# Patient Record
Sex: FEMALE | Race: BLACK OR AFRICAN AMERICAN | Employment: OTHER | URBAN - METROPOLITAN AREA
[De-identification: names, ages, dates, MRNs, and addresses within clinical notes are randomized per-mention and may not be internally consistent; named-entity substitution may affect disease eponyms.]

---

## 2022-06-15 ENCOUNTER — HOSPITAL ENCOUNTER (OUTPATIENT)
Dept: PHYSICAL THERAPY | Age: 62
Setting detail: RECURRING SERIES
Discharge: HOME | End: 2022-06-18
Payer: MEDICARE

## 2022-06-15 PROCEDURE — 97162 PT EVAL MOD COMPLEX 30 MIN: CPT

## 2022-06-15 ASSESSMENT — PAIN SCALES - GENERAL: PAINLEVEL_OUTOF10: 6

## 2022-06-15 NOTE — THERAPY EVALUATION
Christal Lee  : 1960  Primary: Medicare Part A And B  Secondary: [facility] @ [address] [place] [address] Way [address]  Phone: [phone]  Fax: [phone] Plan Frequency: 2/week x 12 weeks    Plan of Care/Certification Expiration Date: 22      PT Visit Info: Total # of Visits to Date: 1      OUTPATIENT PHYSICAL THERAPY:Initial Evaluation 6/15/2022               Episode  Appt Desk         Treatment Diagnosis:  Difficulty in walking, Not elsewhere classified (R26.2)  Low Back Pain (M54.5) Pain in right hip (M25. 551)  Medical/Referring Diagnosis:  Spondylosis without myelopathy or radiculopathy, lumbosacral region [M47.817]  Chronic pain syndrome [G89.4]  Unilateral primary osteoarthritis, unspecified hip [M16.10]  Referring Physician:  Taina Montgomery MD Orders:  PT Eval and Treat Aquatics  Return MD Appt:    Date of Onset:  Onset Date: 09/15/15     Allergies:  Patient has no allergy information on record. Restrictions/Precautions:    Restrictions/Precautions: None  No data recorded   Medications Last Reviewed:  6/15/2022     SUBJECTIVE   History of Injury/Illness (Reason for Referral):  Pt had a fall in  walking into work and she has had problems ever since. Pt states she is coming to therapy now because her R side is very painful and she cannot stand for very long. She also missed the last step on her stairs and christine herself in April and has been hurting on her right side into her groin, runs down both legs into knees and ankles. She also complains of low back pain that goes through to her ant trunk. She is also complaining of pain and swelling in her L arm and elbow. R hip xray has osteoarthritis per her recent x ray. She I having difficulty with standing, walking and daily activities.   Patient Stated Goal(s):  \" Be able to sit a whole service in [employer], stand long enough to wash dishes or tej.\"  Initial:     6/10 Post Session:    6  /10  Past Medical History/Comorbidities:   Ms. Lorenzo Cárdenas  has no past medical history on file. Ms. Lorenzo Cárdenas  has no past surgical history on file. 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  No data recordedNo data recorded   Learning:   Does the patient/guardian have any barriers to learning?: No barriers  Will there be a co-learner?: No  What is the preferred language of the patient/guardian?: English  Is an  required?: No  How does the patient/guardian prefer to learn new concepts?: Listening; Reading; Demonstration     Fall Risk Scale: Total Score: 25  Avila Fall Risk: Medium (25-44)           OBJECTIVE   Trunk ROM-  Trunk flex- finger tips to shin pain in R low back and down L LE                         Ext- to neutral painful                         Side bending - limited to 50%  Painful both directions  Seated LE strength test grossly-  WFL  Limited hip flexion ROM in sitting  Timed up and go- 12.5 seconds  Gait - Walks with slowed gait with limited step length            B ext rotation of feet,   Stairs- used combination of reciprocal and step to gait,  Significant use of UE performed 4 stairs    ASSESSMENT   Initial Assessment:    Problem List: (Impacting functional limitations): Body Structures, Functions, Activity Limitations Requiring Skilled Therapeutic Intervention: Decreased functional mobility ; Decreased ROM; Decreased strength;  Increased pain; Decreased posture      Therapy Prognosis:   Therapy Prognosis: Good     Assessment Complexity:   Medium Complexity  PLAN   Effective Dates: 6/15/22 TO Plan of Care/Certification Expiration Date: 08/17/22     Frequency/Duration: Plan Frequency: 2/week x 12 weeks     Interventions Planned (Treatment may consist of any combination of the following):    Current Treatment Recommendations: Strengthening; ROM; Manual Therapy - Soft Tissue Mobilization; Manual Therapy - Joint Manipulation; Pain management; Home exercise program; Aquatics     Goals: (Goals have been discussed and agreed upon with patient.)  Short-Term Functional Goals: Time Frame: 3-4 weeks  1. Pt to perform 45 minutes of aquatic therapy 2/week consistently  2. Pt to report a decrease in pain either intensity or frequency  3. Discharge Goals: Time Frame: 9-12 weeks  1. 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 Pt to improve TUG by 2 seconds or greater. 3. Improve Oswestry score by 10 or greater indicating improved function. 4. Improve hip flexibility to accommodate longer stepping   5. Up/down  6 stairs with hand railing with greater ease with step over step gait. Outcome Measure: Tool Used: Modified Oswestry Low Back Pain   Score:  Initial: 28/50  Most Recent: X/50 (Date: -- )   Interpretation of Score: Each section is scored on a 0-5 scale, 5 representing the greatest disability. The scores of each section are added together for a total score of 50. Tool Used: Timed Up and Go (TUG)  Score:  Initial: 12.5 seconds Most Recent: X seconds (Date: -- )   Interpretation of Score: The test measures, in seconds, the time taken by an individual to stand up from a standard arm chair (seat height 46 cm [18 in], arm height 65 cm [25.6 in]), walk a distance of 3 meters (118 in, approx 10 ft), turn, walk back to the chair and sit down. If the individual takes longer than 14 seconds to complete TUG, this indicates risk for falls. Medical Necessity:    Patient is expected to demonstrate progress in strength, range of motion, balance and coordination to improve safety during ambulation and stair use.   Also improve endurance for cooking and d=adls. .  Reason For Services/Other Comments:     Total Duration: 45 min  Time In: 1115  Time Out: 1200    Regarding Derwin Fabry Campbell's therapy, I certify that the treatment plan above will be carried out by a therapist or under their direction.   Thank you for this referral,  Clinton Miller, PT     Referring Physician Signature: Jayuya Door* _______________________________ Date _____________        Post Session Pain  Charge Capture  PT Visit Info  POC Link  Treatment Note Link  MD Guidelines  MyChart

## 2022-06-20 ENCOUNTER — HOSPITAL ENCOUNTER (OUTPATIENT)
Dept: PHYSICAL THERAPY | Age: 62
Setting detail: RECURRING SERIES
Discharge: HOME | End: 2022-06-23
Payer: MEDICARE

## 2022-06-20 PROCEDURE — 97113 AQUATIC THERAPY/EXERCISES: CPT

## 2022-06-20 NOTE — PROGRESS NOTES
Dominique Fields  : 1960  Primary: Medicare Part A And B  Secondary: [facility] @ [address] [place] [address] Way [address]  Phone: [phone]  Fax: [phone] Plan Frequency: 2/week x 12 weeks    Plan of Care/Certification Expiration Date: 22      PT Visit Info: Total # of Visits to Date: 2      OUTPATIENT PHYSICAL THERAPY:OP NOTE TYPE: Treatment Note 2022       Episode  }Appt Desk              Treatment Diagnosis: Difficulty in walking, Not elsewhere classified (R26.2)  Low Back Pain (M54.5) Pain in right hip (M25. 551)  Medical/Referring Diagnosis:  Spondylosis without myelopathy or radiculopathy, lumbosacral region [M47.817]  Chronic pain syndrome [G89.4]  Unilateral primary osteoarthritis, unspecified hip [M16.10]  Referring Physician:  Yareli Redd MD Orders:  PT Eval and Treat   Date of Onset:  Onset Date: 09/15/15     Allergies:   Patient has no allergy information on record. Restrictions/Precautions:  Restrictions/Precautions: None  No data recorded   Interventions Planned (Treatment may consist of any combination of the following):    Current Treatment Recommendations: Strengthening; ROM; Manual Therapy - Soft Tissue Mobilization; Manual Therapy - Joint Manipulation; Pain management; Home exercise program; Aquatics     Subjective Comments:  No reports of pain currently. Initial:}     (Pt reports mild pain post treatment.)/10Post Session:        /10  Medications Last Reviewed:  2022  Updated Objective Findings:  Pt ambulates with decreased arm swing and decreased heel strike. Treatment   AQUATIC EXERCISE: (20 minutes):    Exercises per grid below to improve mobility, strength and balance. Required MOD visual and verbal cues to promote proper body mechanics. Progressed repetitions and complexity of movement as indicated.      Date:  22 Date:   Date:     Aquatic exercise Parameters Parameters Parameters   Lateral walk x3 laps      Forward walk x3 laps with cues to increase arm swing     Retro walk x3 laps with one hand on bar     Hip abduction x15 B     Heel raises x20     Step ups x5 ea LE with UE support                 Treatment/Session Summary:    · Treatment Assessment:  Pt required frequent verbal cues for correct body mechanics and posture. She is nervous getting in the seven foot water so the therapist will be in the water with her next visit. · Communication/Consultation:  None today  · Equipment provided today:  None  · Recommendations/Intent for next treatment session: Next visit will focus on strengthening in the pool.     Total Treatment Billable Duration:  20 minutes (pt had the wrong appointment time)  Time In: 1436  Time Out: [address], PTA       Charge Capture  }Post Session Pain  PT Visit Info  [facility]  MD Guidelines  Scanned Media  Benefits  MyChart    Future Appointments   Date Time Provider Heber Lockett   6/23/2022 10:30 AM Alyse Iqbal, PTA [facility]   6/27/2022 11:45 AM Taylor Moser Nearing, PT [facility]   6/30/2022 11:45 AM Taylor Moser Nearing, PT [facility]   7/5/2022 11:15 AM Alyse Huger, PTA [facility]   7/7/2022 11:15 AM Alyse Huger, PTA [facility]   7/12/2022 11:15 AM Alyse Huger, PTA [facility]   7/14/2022 11:15 AM Alyse Huger, PTA [facility]

## 2022-06-23 ENCOUNTER — HOSPITAL ENCOUNTER (OUTPATIENT)
Dept: PHYSICAL THERAPY | Age: 62
Setting detail: RECURRING SERIES
Discharge: HOME | End: 2022-06-26
Payer: MEDICARE

## 2022-06-23 PROCEDURE — 97113 AQUATIC THERAPY/EXERCISES: CPT

## 2022-06-23 NOTE — PROGRESS NOTES
Marisol Pepper  : 1960  Primary: Medicare Part A And B  Secondary: [facility] @ [address] [place] [address] Way [address]  Phone: [phone]  Fax: [phone] Plan Frequency: 2/week x 12 weeks    Plan of Care/Certification Expiration Date: 22      PT Visit Info: Total # of Visits to Date: 3      OUTPATIENT PHYSICAL THERAPY:OP NOTE TYPE: Treatment Note 2022       Episode  }Appt Desk              Treatment Diagnosis: Difficulty in walking, Not elsewhere classified (R26.2)  Low Back Pain (M54.5) Pain in right hip (M25. 551)  Medical/Referring Diagnosis:  Spondylosis without myelopathy or radiculopathy, lumbosacral region [M47.817]  Chronic pain syndrome [G89.4]  Unilateral primary osteoarthritis, unspecified hip [M16.10]  Referring Physician:  Ching Lora MD Orders:  PT Eval and Treat   Date of Onset:  Onset Date: 09/15/15     Allergies:   Patient has no allergy information on record. Restrictions/Precautions:  Restrictions/Precautions: None  No data recorded   Interventions Planned (Treatment may consist of any combination of the following):    Current Treatment Recommendations: Strengthening; ROM; Manual Therapy - Soft Tissue Mobilization; Manual Therapy - Joint Manipulation; Pain management; Home exercise program; Aquatics     Subjective Comments:  Pt states \"I was really sore all over last time. \"  Initial:}     (Pt reports high levels of back pain.)/10Post Session:        /10, no increase reported  Medications Last Reviewed:  2022  Updated Objective Findings:  Pt ambulates with decreased arm swing and decreased heel strike. Treatment   AQUATIC EXERCISE: (35 minutes):    Exercises per grid below to improve mobility, strength and balance. Required MOD visual and verbal cues to promote proper body mechanics. Progressed repetitions and complexity of movement as indicated.  The therapist was in the water with the pt due to fear of deep water. Date:  6/20/22 Date:  6/23/22 Date:     Aquatic exercise Parameters Parameters Parameters   Lateral walk x3 laps  x3 laps    Forward walk x3 laps with cues to increase arm swing Forward/back x 3 laps    Retro walk x3 laps with one hand on bar     marching  x3 laps    Hip abduction x15 B Hip 3 way x15 B each direction with tactile cues    Heel raises x20     Step ups x5 ea LE with UE support     Standing posterior pelvic tilt  x10 with tactile cues    Lunge stretch  20 sec hold x 4 on first pool step with tactile cues    Deep well   With noodle and holding onto therapist    Hip abduction/adduction  x30    Hip flexion/ext  x30    Deep well hang  x2 min          Treatment/Session Summary:    · Treatment Assessment:  Pt reported relief with tactile cues for the hip exercises. She reported some relief from treatment. · Communication/Consultation:  None today  · Equipment provided today:  None  · Recommendations/Intent for next treatment session: Next visit will focus on strengthening in the pool.     Total Treatment Billable Duration:  35 minutes (pt was a few min late)  Time In: 1040  Time Out: 1115    Carmen Bell, PTA       Charge Capture  }Post Session Pain  PT Visit Info  [email or web address] Portal  MD Guidelines  Scanned Media  Benefits  MyChart    Future Appointments   Date Time Provider Heber Lockett   6/27/2022  4:00 PM Solitario Leblanc [facility]   6/30/2022 11:45 AM Taylor Carvajal, PT [facility]   7/5/2022 11:15 AM Carmen [place], PTA [facility]   7/7/2022 11:15 AM Carmen [place], PTA [facility]   7/12/2022 11:15 AM Carmen [place], PTA [facility]   7/14/2022 11:15 AM Carmen [place], PTA [facility]

## 2022-06-29 ENCOUNTER — HOSPITAL ENCOUNTER (OUTPATIENT)
Dept: PHYSICAL THERAPY | Age: 62
Setting detail: RECURRING SERIES
Discharge: HOME | End: 2022-07-02
Payer: MEDICARE

## 2022-06-29 PROCEDURE — 97113 AQUATIC THERAPY/EXERCISES: CPT

## 2022-06-29 ASSESSMENT — PAIN SCALES - GENERAL: PAINLEVEL_OUTOF10: 4

## 2022-07-01 ENCOUNTER — HOSPITAL ENCOUNTER (OUTPATIENT)
Dept: PHYSICAL THERAPY | Age: 62
Setting detail: RECURRING SERIES
Discharge: HOME | End: 2022-07-04
Payer: MEDICARE

## 2022-07-01 PROCEDURE — 97113 AQUATIC THERAPY/EXERCISES: CPT

## 2022-07-05 ENCOUNTER — HOSPITAL ENCOUNTER (OUTPATIENT)
Dept: PHYSICAL THERAPY | Age: 62
Setting detail: RECURRING SERIES
Discharge: HOME | End: 2022-07-08
Payer: MEDICARE

## 2022-07-05 PROCEDURE — 97113 AQUATIC THERAPY/EXERCISES: CPT

## 2022-07-05 NOTE — PROGRESS NOTES
Lisa Burk  : 1960  Primary: Medicare Part A And B  Secondary: [facility] @ [facility] [address] [address]  Phone: [phone]  Fax: [phone] Plan Frequency: 2/week x 12 weeks    Plan of Care/Certification Expiration Date: 22      PT Visit Info: Total # of Visits to Date: 6      OUTPATIENT PHYSICAL THERAPY:OP NOTE TYPE: Treatment Note 2022       Episode  }Appt Desk              Treatment Diagnosis: Difficulty in walking, Not elsewhere classified (R26.2)  Low Back Pain (M54.5) Pain in right hip (M25. 551)  Medical/Referring Diagnosis:  Spondylosis without myelopathy or radiculopathy, lumbosacral region [M47.817]  Chronic pain syndrome [G89.4]  Unilateral primary osteoarthritis, unspecified hip [M16.10]  Referring Physician:  Herminia Pozo MD Orders:  PT Eval and Treat   Date of Onset:  Onset Date: 09/15/15     Allergies:   Patient has no allergy information on record. Restrictions/Precautions:  Restrictions/Precautions: None  No data recorded   Interventions Planned (Treatment may consist of any combination of the following):    Current Treatment Recommendations: Strengthening; ROM; Manual Therapy - Soft Tissue Mobilization; Manual Therapy - Joint Manipulation; Pain management; Home exercise program; Aquatics     Subjective Comments:    Pt states that the pain was worse this morning. Initial:}     (No number given.)/10Post Session:        Pt reported less pain post treatment. Medications Last Reviewed:  2022  Updated Objective Findings:  Pt ambulates with decreased arm swing and decreased heel strike. Treatment   AQUATIC EXERCISE: (45 minutes):    Exercises per grid below to improve mobility, strength and balance. Required MOD visual and verbal cues to promote proper body mechanics. Progressed repetitions and complexity of movement as indicated.       Date:  22 Date:  22 Date:  22 Date  7/1/22 Date  7/5/22   Aquatic exercise Parameters Parameters Parameters Parameters: in 3.5 ft water Parameters: in 3.5 ft water   Lateral walk x3 laps  x3 laps X 3 laps x3 laps x3 laps   Forward walk x3 laps with cues to increase arm swing Forward/back x 3 laps X 3 laps x4 laps X 4 laps   Tandem stance     x3 laps with one hand on bar for balance   Retro walk x3 laps with one hand on bar  X 3 laps x4 laps X 3 laps   marching  x3 laps X 3 laps x3 laps x3 laps   Hip abduction x15 B Hip 3 way x15 B each direction with tactile cues  Hip 3 way at bar with abdominal stabilization x15 each    Heel raises x20   x30    UE movement with narrow stance for balance     Flexion/ext x30    Rows x20    Shoulder abduction x30     Step ups x5 ea LE with UE support   Onto red box x15 B    Standing posterior pelvic tilt  x10 with tactile cues      Lunge stretch  20 sec hold x 4 on first pool step with tactile cues   20 sec hold x4 B    Deep well   With noodle and holding onto therapist  With noodle  With noodle   bicycle    x30 x3 min   Hip abduction/adduction  x30  x30 x30   Hip flexion/ext  x30  x30    Deep well hang  x2 min  x3 min x3 min         Treatment/Session Summary:    · Treatment Assessment:  Pt reported mild fatigue and required frequent verbal and visual cues for correct body mechanics. · Communication/Consultation:  None today  · Equipment provided today:  None  · Recommendations/Intent for next treatment session: Next visit will focus on strengthening in the pool.     Total Treatment Billable Duration:  45 minutes   Time In: 0554  Time Out: 1200    [place] Perez PTA       Charge Capture  }Post Session Pain  PT Visit Info  [employer] Portal  MD Guidelines  Scanned Media  Benefits  MyChart    Future Appointments   Date Time Provider Heber Lockett   7/7/2022 11:15 AM [place] Perez PTA [facility]   7/12/2022 11:15 AM [place] Perez PTA [facility]   7/14/2022 11:15 AM [place] Perez PTA [facility]

## 2022-07-07 ENCOUNTER — HOSPITAL ENCOUNTER (OUTPATIENT)
Dept: PHYSICAL THERAPY | Age: 62
Setting detail: RECURRING SERIES
Discharge: HOME | End: 2022-07-10
Payer: MEDICARE

## 2022-07-07 PROCEDURE — 97113 AQUATIC THERAPY/EXERCISES: CPT

## 2022-07-07 NOTE — PROGRESS NOTES
Guillermo Cox  : 1960  Primary: Medicare Part A And B  Secondary: [facility] @ [facility] [address] [address]  Phone: [phone]  Fax: [phone] Plan Frequency: 2/week x 12 weeks    Plan of Care/Certification Expiration Date: 22      PT Visit Info: Total # of Visits to Date: 7      OUTPATIENT PHYSICAL THERAPY:OP NOTE TYPE: Treatment Note 2022       Episode  }Appt Desk              Treatment Diagnosis: Difficulty in walking, Not elsewhere classified (R26.2)  Low Back Pain (M54.5) Pain in right hip (M25. 551)  Medical/Referring Diagnosis:  Spondylosis without myelopathy or radiculopathy, lumbosacral region [M47.817]  Chronic pain syndrome [G89.4]  Unilateral primary osteoarthritis, unspecified hip [M16.10]  Referring Physician:  Smith Quinones MD Orders:  PT Eval and Treat   Date of Onset:  Onset Date: 09/15/15     Allergies:   Patient has no allergy information on record. Restrictions/Precautions:  Restrictions/Precautions: None  No data recorded   Interventions Planned (Treatment may consist of any combination of the following):    Current Treatment Recommendations: Strengthening; ROM; Manual Therapy - Soft Tissue Mobilization; Manual Therapy - Joint Manipulation; Pain management; Home exercise program; Aquatics     Subjective Comments:    Pt states that she did not sleep well last night and she feels terrible. Initial:}     (Pt reports moderate pain all over the body.)/10Post Session:        Pt stated \"I feel a little bit better. \"  Medications Last Reviewed:  2022  Updated Objective Findings:  Pt ambulates with decreased arm swing and decreased heel strike. Treatment   AQUATIC EXERCISE: (45 minutes):    Exercises per grid below to improve mobility, strength and balance. Required MOD visual and verbal cues to promote proper body mechanics.   Progressed repetitions and complexity of movement as indicated. Date:  6/20/22 Date:  6/23/22 Date:  6/29/22 Date  7/1/22 Date  7/5/22 Date  7/7/22   Aquatic exercise Parameters Parameters Parameters Parameters: in 3.5 ft water Parameters: in 3.5 ft water Parameters: in 3.5 ft water    Lateral walk x3 laps  x3 laps X 3 laps x3 laps x3 laps x3 laps with open paddles   Forward walk x3 laps with cues to increase arm swing Forward/back x 3 laps X 3 laps x4 laps X 4 laps X 4 laps   Tandem stance     x3 laps with one hand on bar for balance x3 laps with cues to gaze forward   Retro walk x3 laps with one hand on bar  X 3 laps x4 laps X 3 laps x3 laps   marching  x3 laps X 3 laps x3 laps x3 laps x3 laps with open paddles   Stretching for lumbar/thoracic spine      Spinal flexion/extension with noodle x10    Standing thoracic rotation x10 B with noodle   Chin tucks      x10 seated    Scapula retraction 2x10 seated   Hip abduction x15 B Hip 3 way x15 B each direction with tactile cues  Hip 3 way at bar with abdominal stabilization x15 each     Heel raises x20   x30     UE movement with narrow stance for balance     Flexion/ext x30    Rows x20    Shoulder abduction x30      Step ups x5 ea LE with UE support   Onto red box x15 B     Standing posterior pelvic tilt  x10 with tactile cues       Lunge stretch  20 sec hold x 4 on first pool step with tactile cues   20 sec hold x4 B     Deep well   With noodle and holding onto therapist  With noodle  With noodle With noodle   bicycle    x30 x3 min x3 min   Hip abduction/adduction  x30  x30 x30 x30   Hip flexion/ext  x30  x30  x30   Deep well hang  x2 min  x3 min x3 min x3 min         Treatment/Session Summary:    · Treatment Assessment:  Pt reported relief after treatment but she is tight and guarded with all movement. Treatment focused on increasing spinal AROM to promote more fluid movement.   · Communication/Consultation:  None today  · Equipment provided today:  None  · Recommendations/Intent for next treatment session: Next visit will focus on strengthening in the pool.     Total Treatment Billable Duration:  45 minutes   Time In: 8854  Time Out: 1200    Teofilo Bauman PTA       Charge Capture  }Post Session Pain  PT Visit Info  [facility] Portal  MD Guidelines  Scanned Media  Benefits  MyChart    Future Appointments   Date Time Provider Heber Lockett   7/13/2022 10:00 AM Deirdre SAINT-DIÉ, PT [facility]   7/14/2022 11:15 AM Alinda Amel, PTA [facility]   7/18/2022 11:15 AM Alinda Amel, PTA [facility]   7/20/2022 11:15 AM Alinda Amel, PTA [facility]   7/25/2022 11:15 AM Alinda Amel, PTA [facility]   7/27/2022 11:15 AM Alinda Amel, PTA [facility]

## 2022-07-12 ENCOUNTER — APPOINTMENT (OUTPATIENT)
Dept: PHYSICAL THERAPY | Age: 62
End: 2022-07-12
Payer: MEDICARE

## 2022-07-13 ENCOUNTER — HOSPITAL ENCOUNTER (OUTPATIENT)
Dept: PHYSICAL THERAPY | Age: 62
Setting detail: RECURRING SERIES
Discharge: HOME | End: 2022-07-16
Payer: MEDICARE

## 2022-07-13 PROCEDURE — 97113 AQUATIC THERAPY/EXERCISES: CPT

## 2022-07-13 ASSESSMENT — PAIN SCALES - GENERAL: PAINLEVEL_OUTOF10: 3

## 2022-07-13 NOTE — PROGRESS NOTES
Trini Barbosa  : 1960  Primary: Medicare Part A And B  Secondary: [place] @ [address] [address]  Phone: [phone]  Fax: [phone] Plan Frequency: 2/week x 12 weeks    Plan of Care/Certification Expiration Date: 22      PT Visit Info: Total # of Visits to Date: 250 [address] report OP NOTE TYPE:  2022       Episode  }Appt Desk              Treatment Diagnosis: Difficulty in walking, Not elsewhere classified (R26.2)  Low Back Pain (M54.5) Pain in right hip (M25. 551)  Medical/Referring Diagnosis:  Spondylosis without myelopathy or radiculopathy, lumbosacral region [M47.817]  Chronic pain syndrome [G89.4]  Unilateral primary osteoarthritis, unspecified hip [M16.10]  Referring Physician:  Jeralyn Gilford* MD Orders:  PT Eval and Treat   Date of Onset:  Onset Date: 09/15/15     Allergies:   Patient has no allergy information on record. Restrictions/Precautions:  Restrictions/Precautions: None  No data recorded   Interventions Planned (Treatment may consist of any combination of the following):    Current Treatment Recommendations: Strengthening; ROM; Manual Therapy - Soft Tissue Mobilization; Manual Therapy - Joint Manipulation; Pain management; Home exercise program; Aquatics     Subjective Comments:  States she is some better since beginning treatment. She feels stronger and states she can get around better. Initial:}    3/10Post Session:       2/10  Medications Last Reviewed:  2022  Updated Objective Findings:  Pt has attended 8 visits of PT - aquatic therapy. She has made progress and is on track to continue making progress to reach goals below. Treatment   Goals: (Goals have been discussed and agreed upon with patient.)  Short-Term Functional Goals: Time Frame: 3-4 weeks  1.  Pt to perform 45 minutes of aquatic therapy 2/week consistently  MET 7/13/22  2. Pt to report a decrease in pain either intensity or frequency  MET 7/13/22   3.    Discharge Goals: Time Frame: 9-12 weeks  1. 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 Pt to improve TUG by 2 seconds or greater. Not tested  3. Improve Oswestry score by 10 or greater indicating improved function. Not tested  4. Improve hip flexibility to accommodate longer stepping  Ongoing 7/12/22  5. Up/down  6 stairs with hand railing with greater ease with step over step gait.  Ongoing  7/13/22  [id number]}       Taylor Lam Vernon, PT       Charge Capture  }Post Session Pain  PT Visit Info  [email or web address] Portal  MD Guidelines  Scanned Media  Benefits  MyChart    Future Appointments   Date Time Provider Heber Lockett   7/14/2022 11:15 AM Grant Castorena PTA [facility]   7/18/2022 11:15 AM Grant Castorena PTA [facility]   7/20/2022 11:15 AM Grant Castorena PTA [facility]   7/25/2022 11:15 AM Grant Castorena PTA [facility]   7/27/2022 11:15 AM Laygeronimo Castorena, PTA [facility]

## 2022-07-13 NOTE — PROGRESS NOTES
Trini Barbosa  : 1960  Primary: Medicare Part A And B  Secondary: [place] @ [facility] [address] [address]  Phone: [phone]  Fax: [phone] Plan Frequency: 2/week x 12 weeks    Plan of Care/Certification Expiration Date: 22      PT Visit Info: Total # of Visits to Date: 8      OUTPATIENT PHYSICAL THERAPY:OP NOTE TYPE: Treatment Note 2022       Episode  }Appt Desk              Treatment Diagnosis: Difficulty in walking, Not elsewhere classified (R26.2)  Low Back Pain (M54.5) Pain in right hip (M25. 551)  Medical/Referring Diagnosis:  Spondylosis without myelopathy or radiculopathy, lumbosacral region [M47.817]  Chronic pain syndrome [G89.4]  Unilateral primary osteoarthritis, unspecified hip [M16.10]  Referring Physician:  Jeralyn Gilford* MD Orders:  PT Eval and Treat   Date of Onset:  Onset Date: 09/15/15     Allergies:   Patient has no allergy information on record. Restrictions/Precautions:  Restrictions/Precautions: None  No data recorded   Interventions Planned (Treatment may consist of any combination of the following):    Current Treatment Recommendations: Strengthening; ROM; Manual Therapy - Soft Tissue Mobilization; Manual Therapy - Joint Manipulation; Pain management; Home exercise program; Aquatics     Subjective Comments:    States she is some better since beginning treatment. She feels stronger and states she can get around better. Initial:}    3/10Post Session:          Medications Last Reviewed:  2022  Updated Objective Findings:  See evaluation note from today progress note  Treatment   AQUATIC EXERCISE: (45 minutes):    Exercises per grid below to improve mobility, strength and balance. Required MOD visual and verbal cues to promote proper body mechanics. Progressed repetitions and complexity of movement as indicated.       Date:  22 Date:  22 Date:  22 Date  7/1/22 Date  7/5/22 Date  7/7/22 Date  7/13/22   Aquatic exercise Parameters Parameters Parameters Parameters: in 3.5 ft water Parameters: in 3.5 ft water Parameters: in 3.5 ft water  Parameters in 3,5 depth   Lateral walk x3 laps  x3 laps X 3 laps x3 laps x3 laps x3 laps with open paddles X 3 with open paddles   Forward walk x3 laps with cues to increase arm swing Forward/back x 3 laps X 3 laps x4 laps X 4 laps X 4 laps x3 laps   Tandem stance     x3 laps with one hand on bar for balance x3 laps with cues to gaze forward X 3 laps   Retro walk x3 laps with one hand on bar  X 3 laps x4 laps X 3 laps x3 laps X 3 laps   marching  x3 laps X 3 laps x3 laps x3 laps x3 laps with open paddles X 3 laps   Stretching for lumbar/thoracic spine      Spinal flexion/extension with noodle x10    Standing thoracic rotation x10 B with noodle Spinal flex/ext with noodle x 10      Standing thoracic rotation x 10 B with noodle   Chin tucks      x10 seated    Scapula retraction 2x10 seated X 10    scap retraction- 10   Hip abduction x15 B Hip 3 way x15 B each direction with tactile cues  Hip 3 way at bar with abdominal stabilization x15 each      Heel raises x20   x30      UE movement with narrow stance for balance     Flexion/ext x30    Rows x20    Shoulder abduction x30       Step ups x5 ea LE with UE support   Onto red box x15 B      Standing posterior pelvic tilt  x10 with tactile cues        Lunge stretch  20 sec hold x 4 on first pool step with tactile cues   20 sec hold x4 B      Deep well   With noodle and holding onto therapist  With noodle  With noodle With noodle With noodle    bicycle    x30 x3 min x3 min X 3 min   Hip abduction/adduction  x30  x30 x30 x30 X 30   Hip flexion/ext  x30  x30  x30 X 30   Deep well hang  x2 min  x3 min x3 min x3 min X 3 min     DKTC x 10     Treatment/Session Summary:    · Treatment Assessment: Doing well with aquatic therapy. Works hard in [employer]. See progress note today. · Communication/Consultation:  None today  · Equipment provided today:  None  · Recommendations/Intent for next treatment session: Next visit will focus on strengthening in the pool.     Total Treatment Billable Duration:  45 minutes        Charles Rubalcava, PT       Charge Capture  }Post Session Pain  PT Visit Info  [employer] Portal  MD Guidelines  Scanned Media  Benefits  MyChart    Future Appointments   Date Time Provider Heber Lockett   7/14/2022 11:15 AM Gabriella Lipps, PTA [facility]   7/18/2022 11:15 AM Gabriella Lipps, PTA [facility]   7/20/2022 11:15 AM Gabriella Lipps, PTA [facility]   7/25/2022 11:15 AM Gabriella Lipps, PTA [facility]   7/27/2022 11:15 AM Gabriella Lipps, PTA [facility]

## 2022-07-14 ENCOUNTER — HOSPITAL ENCOUNTER (OUTPATIENT)
Dept: PHYSICAL THERAPY | Age: 62
Setting detail: RECURRING SERIES
Discharge: HOME | End: 2022-07-17
Payer: MEDICARE

## 2022-07-14 PROCEDURE — 97113 AQUATIC THERAPY/EXERCISES: CPT

## 2022-07-14 ASSESSMENT — PAIN SCALES - GENERAL: PAINLEVEL_OUTOF10: 5

## 2022-07-14 NOTE — PROGRESS NOTES
Johanna Mcarthur  : 1960  Primary: Medicare Part A And B  Secondary: [place] @ [address] [address]  [place] [address]  Phone: [phone]  Fax: [phone] Plan Frequency: 2/week x 12 weeks    Plan of Care/Certification Expiration Date: 22      PT Visit Info: Total # of Visits to Date: 9      OUTPATIENT PHYSICAL THERAPY:OP NOTE TYPE: Treatment Note 2022       Episode  }Appt Desk              Treatment Diagnosis: Difficulty in walking, Not elsewhere classified (R26.2)  Low Back Pain (M54.5) Pain in right hip (M25. 551)  Medical/Referring Diagnosis:  Spondylosis without myelopathy or radiculopathy, lumbosacral region [M47.817]  Chronic pain syndrome [G89.4]  Unilateral primary osteoarthritis, unspecified hip [M16.10]  Referring Physician:  Michel Piña MD Orders:  PT Eval and Treat   Date of Onset:  Onset Date: 09/15/15     Allergies:   Patient has no allergy information on record. Restrictions/Precautions:  Restrictions/Precautions: None  No data recorded   Interventions Planned (Treatment may consist of any combination of the following):    Current Treatment Recommendations: Strengthening; ROM; Manual Therapy - Soft Tissue Mobilization; Manual Therapy - Joint Manipulation; Pain management; Home exercise program; Aquatics     Subjective Comments:    Pt reports moderate pain in the back and posterior hips. Initial:}    5/10Post Session:        (Pt reports relief from back pain.)  Medications Last Reviewed:  2022  Updated Objective Findings:   progress note 22  Treatment   AQUATIC EXERCISE: (45 minutes):    Exercises per grid below to improve mobility, strength and balance. Required MOD visual and verbal cues to promote proper body mechanics. Progressed repetitions and complexity of movement as indicated.       Date:  22 Date:  22 Date:  22 Date  22 Date  22 Date  22 abduction/adduction  x30  x30 x30 x30 X 30 x30   Hip flexion/ext  x30  x30  x30 X 30 x30   Deep well hang  x2 min  x3 min x3 min x3 min X 3 min x3 min       Treatment/Session Summary:    · Treatment Assessment:   Pt reported relief after treatment. She requires frequent verbal cues to tilt the pelvis posteriorly but she is becoming more aware of her posture. · Communication/Consultation:  None today  · Equipment provided today:  None  · Recommendations/Intent for next treatment session: Next visit will focus on strengthening in the pool.     Total Treatment Billable Duration:  45 minutes   Time In: 7498  Time Out: 1200    Audra Eye, PTA       Charge Capture  }Post Session Pain  PT Visit Info  [employer] Portal  MD Guidelines  Scanned Media  Benefits  MyChart    Future Appointments   Date Time Provider Heber Lockett   7/18/2022 11:15 AM [facility]   7/20/2022 11:15 AM [facility] Eye[facility]   7/25/2022 11:15 AM Audra Eye, PTA [facility]   7/27/2022 11:15 AM Audra Eye, PTA [facility]

## 2022-07-18 ENCOUNTER — HOSPITAL ENCOUNTER (OUTPATIENT)
Dept: PHYSICAL THERAPY | Age: 62
Setting detail: RECURRING SERIES
Discharge: HOME | End: 2022-07-21
Payer: MEDICARE

## 2022-07-18 PROCEDURE — 97113 AQUATIC THERAPY/EXERCISES: CPT

## 2022-07-18 ASSESSMENT — PAIN SCALES - GENERAL: PAINLEVEL_OUTOF10: 4

## 2022-07-18 NOTE — PROGRESS NOTES
Onelia Madrid  : 1960  Primary: Medicare Part A And B  Secondary: [place] @ [facility] [address]  Phone: [phone]  Fax: [phone] Plan Frequency: 2/week x 12 weeks    Plan of Care/Certification Expiration Date: 22      PT Visit Info: Total # of Visits to Date: 10      OUTPATIENT PHYSICAL THERAPY:OP NOTE TYPE: Treatment Note 2022       Episode  }Appt Desk              Treatment Diagnosis: Difficulty in walking, Not elsewhere classified (R26.2)  Low Back Pain (M54.5) Pain in right hip (M25. 551)  Medical/Referring Diagnosis:  Spondylosis without myelopathy or radiculopathy, lumbosacral region [M47.817]  Chronic pain syndrome [G89.4]  Unilateral primary osteoarthritis, unspecified hip [M16.10]  Referring Physician:  Daniel Bauer MD Orders:  PT Eval and Treat   Date of Onset:  Onset Date: 09/15/15     Allergies:   Patient has no allergy information on record. Restrictions/Precautions:  Restrictions/Precautions: None  No data recorded   Interventions Planned (Treatment may consist of any combination of the following):    Current Treatment Recommendations: Strengthening; ROM; Manual Therapy - Soft Tissue Mobilization; Manual Therapy - Joint Manipulation; Pain management; Home exercise program; Aquatics     Subjective Comments:       Initial:}    4/10Post Session:       4  Medications Last Reviewed:  2022  Updated Objective Findings:   progress note 22  Treatment   AQUATIC EXERCISE: (45 minutes):    Exercises per grid below to improve mobility, strength and balance. Required MOD visual and verbal cues to promote proper body mechanics. Progressed repetitions and complexity of movement as indicated.       Date:  22 Date  22 Date  22 Date  22 Date  22 Date  22 Date  22   Aquatic exercise Parameters Parameters: in 3.5 ft water Parameters: in 3.5 ft water Parameters: in 3.5 ft water  Parameters in 3,5 depth Parameters: 3.5 ft water Parameters: 3.5 ft water   Lateral walk X 3 laps x3 laps x3 laps x3 laps with open paddles X 3 with open paddles x3 laps open paddles X3 laps partially closed paddles   Forward walk X 3 laps x4 laps X 4 laps X 4 laps x3 laps x3 laps X3 laps   Tandem stance   x3 laps with one hand on bar for balance x3 laps with cues to gaze forward X 3 laps  X3 laps with one hand on bar   Retro walk X 3 laps x4 laps X 3 laps x3 laps X 3 laps X 3 laps X3 laps   marching X 3 laps x3 laps x3 laps x3 laps with open paddles X 3 laps x3 laps with open paddles X3 laps partially closed paddles   Stretching for lumbar/thoracic spine    Spinal flexion/extension with noodle x10    Standing thoracic rotation x10 B with noodle Spinal flex/ext with noodle x 10      Standing thoracic rotation x 10 B with noodle Spine flexion/ext x15 with noodle    Thoracic rotation x15 B with noodle Spinal flexion/ext x15 with noodle    Thoracic rotation x15 B with noodle   Chin tucks    x10 seated    Scapula retraction 2x10 seated X 10    scap retraction- 10 x10 standing    2x10 scapula retraction X10 standing    2x10 scapula retraction   Hip abduction  Hip 3 way at bar with abdominal stabilization x15 each        Heel raises  x30        UE movement with narrow stance for balance   Flexion/ext x30    Rows x20    Shoulder abduction x30         Step ups  Onto red box x15 B    x15 B with UE support onto red box    Standing posterior pelvic tilt          Lunge stretch   20 sec hold x4 B    20 sec hold x 4 B 20 sec hold x 4 B   Deep well   With noodle  With noodle With noodle With noodle  With noodle With noodle   Single knee to chest with abdominal stabilization      x15 each LE Hip circles x10 ea direction   bicycle  x30 x3 min x3 min X 3 min x30 x30   Hip abduction/adduction  x30 x30 x30 X 30 x30 x30   Hip flexion/ext  x30  x30 X 30 x30 x30   Deep well hang  x3 min x3 min x3 min X 3 min x3 min X3 min       Treatment/Session Summary:    Treatment Assessment:   Pt required less verbal cues for correct body mechanics. Reviewed chin tucks and scapula retraction for the HEP. Communication/Consultation:  None today  Equipment provided today:  None  Recommendations/Intent for next treatment session: Next visit will focus on strengthening in the pool.     Total Treatment Billable Duration:  45 minutes   Time In: 9308  Time Out: 1200    Jv Quintero PTA       Charge Capture  }Post Session Pain  PT Visit Info  [employer] Portal  MD Guidelines  Scanned Media  Benefits  MyChart    Future Appointments   Date Time Provider Heber Lockett   7/20/2022 11:15 AM Jv Quintero PTA [facility]   7/25/2022 11:15 AM Jv Quintero PTA [facility]   7/27/2022 11:15 AM Jv Quintero PTA [facility]

## 2022-07-20 ENCOUNTER — HOSPITAL ENCOUNTER (OUTPATIENT)
Dept: PHYSICAL THERAPY | Age: 62
Setting detail: RECURRING SERIES
Discharge: HOME | End: 2022-07-23
Payer: MEDICARE

## 2022-07-20 PROCEDURE — 97113 AQUATIC THERAPY/EXERCISES: CPT

## 2022-07-20 ASSESSMENT — PAIN SCALES - GENERAL: PAINLEVEL_OUTOF10: 3

## 2022-07-20 NOTE — PROGRESS NOTES
Kwabena Archer  : 1960  Primary: Medicare Part A And B  Secondary: [place] @ [facility] [address]  Phone: [phone]  Fax: [phone] Plan Frequency: 2/week x 12 weeks    Plan of Care/Certification Expiration Date: 22      PT Visit Info: Total # of Visits to Date: 6      OUTPATIENT PHYSICAL THERAPY:OP NOTE TYPE: Treatment Note 2022       Episode  }Appt Desk              Treatment Diagnosis: Difficulty in walking, Not elsewhere classified (R26.2)  Low Back Pain (M54.5) Pain in right hip (M25. 551)  Medical/Referring Diagnosis:  Spondylosis without myelopathy or radiculopathy, lumbosacral region [M47.817]  Chronic pain syndrome [G89.4]  Unilateral primary osteoarthritis, unspecified hip [M16.10]  Referring Physician:  Lauryn Adler*  MD Orders:  PT Eval and Treat   Date of Onset:  Onset Date: 09/15/15     Allergies:   Patient has no allergy information on record. Restrictions/Precautions:  Restrictions/Precautions: None  No data recorded   Interventions Planned (Treatment may consist of any combination of the following):    Current Treatment Recommendations: Strengthening; ROM; Manual Therapy - Soft Tissue Mobilization; Manual Therapy - Joint Manipulation; Pain management; Home exercise program; Aquatics     Subjective Comments:    Pt report mild lumbar pain. Initial:}    3/10Post Session:       3  Medications Last Reviewed:  2022  Updated Objective Findings:   progress note 22  Treatment   AQUATIC EXERCISE: (45 minutes):    Exercises per grid below to improve mobility, strength and balance. Required MOD visual and verbal cues to promote proper body mechanics. Progressed repetitions and complexity of movement as indicated.       Date:  22 Date  22 Date  22 Date  22 Date  22 Date  22 Date  22 Date  22   Aquatic exercise Parameters Parameters: in 3.5 pelvic tilt           Lunge stretch   20 sec hold x4 B    20 sec hold x 4 B 20 sec hold x 4 B 20 sec hold x 4 B   Deep well   With noodle  With noodle With noodle With noodle  With noodle With noodle With noodle   Single knee to chest with abdominal stabilization      x15 each LE Hip circles x10 ea direction    bicycle  x30 x3 min x3 min X 3 min x30 x30 x30   Hip abduction/adduction  x30 x30 x30 X 30 x30 x30 x30   Hip flexion/ext  x30  x30 X 30 x30 x30 x30   Deep well hang  x3 min x3 min x3 min X 3 min x3 min X3 min 3 min       Treatment/Session Summary:    Treatment Assessment:   Pt is making progress with pain goals. She requires verbal and visual cues for correct body mechanics and she is stiff and guarded with movement. She walked around the pool deck prior to treatment (she was early for her appointment), and she had increased gait speed and heel strike. Communication/Consultation:  None today  Equipment provided today:  None  Recommendations/Intent for next treatment session: Next visit will focus on strengthening in the pool.     Total Treatment Billable Duration:  45 minutes   Time In: 6430  Time Out: 1200    Gen[place] Comment, PTA       Charge Capture  }Post Session Pain  PT Visit Info  [employer] Portal  MD Guidelines  Scanned Media  Benefits  MyChart    Future Appointments   Date Time Provider Heber Lockett   7/25/2022 11:15 AM Geneva Comment, PTA [facility]   7/27/2022 11:15 AM Geneva Comment, PTA [facility]   8/9/2022 10:30 AM [place] Comment, PTA [facility]   8/11/2022 11:00 AM Taylor Nicklas Plaster, PT [facility]   8/15/2022 11:15 AM [place] Comment, PTA [facility]   8/18/2022 11:15 AM [place] Comment, PTA [facility]   8/22/2022 11:15 AM [place] Comment, PTA [facility]   8/24/2022 11:15 AM [place] Comment, PTA [facility]

## 2022-07-25 ENCOUNTER — HOSPITAL ENCOUNTER (OUTPATIENT)
Dept: PHYSICAL THERAPY | Age: 62
Setting detail: RECURRING SERIES
End: 2022-07-25
Payer: MEDICARE

## 2022-07-27 ENCOUNTER — HOSPITAL ENCOUNTER (OUTPATIENT)
Dept: PHYSICAL THERAPY | Age: 62
Setting detail: RECURRING SERIES
Discharge: HOME | End: 2022-07-30
Payer: MEDICARE

## 2022-07-27 PROCEDURE — 97113 AQUATIC THERAPY/EXERCISES: CPT

## 2022-07-27 NOTE — PROGRESS NOTES
Radha Herron  : 1960  Primary: Medicare Part A And B  Secondary: [place] @ [facility] [address] [address]  Phone: [phone]  Fax: [phone] Plan Frequency: 2/week x 12 weeks    Plan of Care/Certification Expiration Date: 22      PT Visit Info: Total # of Visits to Date: 15      OUTPATIENT PHYSICAL THERAPY:OP NOTE TYPE: Treatment Note 2022       Episode  }Appt Desk              Treatment Diagnosis: Difficulty in walking, Not elsewhere classified (R26.2)  Low Back Pain (M54.5) Pain in right hip (M25. 551)  Medical/Referring Diagnosis:  Spondylosis without myelopathy or radiculopathy, lumbosacral region [M47.817]  Chronic pain syndrome [G89.4]  Unilateral primary osteoarthritis, unspecified hip [M16.10]  Referring Physician:  Patsy Bell MD Orders:  PT Eval and Treat   Date of Onset:  Onset Date: 09/15/15     Allergies:   Patient has no allergy information on record. Restrictions/Precautions:  Restrictions/Precautions: None  No data recorded   Interventions Planned (Treatment may consist of any combination of the following):    Current Treatment Recommendations: Strengthening; ROM; Manual Therapy - Soft Tissue Mobilization; Manual Therapy - Joint Manipulation; Pain management; Home exercise program; Aquatics     Subjective Comments:    Pt reports mild pain in the posterior R hip. Initial:}     (mild pain)/10Post Session:        (No increase)  Medications Last Reviewed:  2022  Updated Objective Findings:   progress note 22  Treatment   AQUATIC EXERCISE: (45 minutes):    Exercises per grid below to improve mobility, strength and balance. Required MOD visual and verbal cues to promote proper body mechanics. Progressed repetitions and complexity of movement as indicated.       Date  22 Date  22 Date  22 Date  22 Date  22 Date  22 Date  22   Aquatic exercise Parameters: in 3.5 ft water Parameters: in 3.5 ft water  Parameters in 3,5 depth Parameters: 3.5 ft water Parameters: 3.5 ft water Parameters:   3.5 ft water Parameters: 3.5 ft water   Lateral walk x3 laps x3 laps with open paddles X 3 with open paddles x3 laps open paddles X3 laps partially closed paddles X3 laps partially closed  paddles X3 laps with paddles in half squat   Forward walk X 4 laps X 4 laps x3 laps x3 laps X3 laps Pt walked around the pool deck prior, several laps Pt walked in the 4.5 ft side several laps   Tandem stance x3 laps with one hand on bar for balance x3 laps with cues to gaze forward X 3 laps  X3 laps with one hand on bar X3 laps with one hand on bar    Walking lunges       X3 laps with one hand on bar   Retro walk X 3 laps x3 laps X 3 laps X 3 laps X3 laps X3 laps with partially closed paddles    marching x3 laps x3 laps with open paddles X 3 laps x3 laps with open paddles X3 laps partially closed paddles X3 laps with dumbbells    Stretching for lumbar/thoracic spine  Spinal flexion/extension with noodle x10    Standing thoracic rotation x10 B with noodle Spinal flex/ext with noodle x 10      Standing thoracic rotation x 10 B with noodle Spine flexion/ext x15 with noodle    Thoracic rotation x15 B with noodle Spinal flexion/ext x15 with noodle    Thoracic rotation x15 B with noodle Spinal flexion/ext x15 with noodle    Thoracic rotation B x15 with noodle Spinal flexion/ext x15 yellow noodle    Thoracic rotation B x15 with noodle   Chin tucks  x10 seated    Scapula retraction 2x10 seated X 10    scap retraction- 10 x10 standing    2x10 scapula retraction X10 standing    2x10 scapula retraction X10 standing    2x10 scapula retraction with paddles X10 in standing   Hip abduction      Hip 3 way x10 each B in 4.5 ft water X30  each B in 4.5 ft water (hip flexion, ext, and abduction)   Heel raises          UE movement with narrow stance for balance Flexion/ext x30    Rows x20    Shoulder abduction x30        In 4.5 ft water  Rows x30  Shoulder ABD x30 with closed paddles   Step ups    x15 B with UE support onto red box  4.5 ft steps- ascend and descend x10 with reciprocal gait    Standing posterior pelvic tilt          Lunge stretch 20 sec hold x4 B    20 sec hold x 4 B 20 sec hold x 4 B 20 sec hold x 4 B 20 sec hold x 4 B   Deep well  With noodle With noodle With noodle  With noodle With noodle With noodle With yellow noodle   Single knee to chest with abdominal stabilization    x15 each LE Hip circles x10 ea direction     bicycle x3 min x3 min X 3 min x30 x30 x30 x30   Hip abduction/adduction x30 x30 X 30 x30 x30 x30 x30   Hip flexion/ext  x30 X 30 x30 x30 x30 x30   Deep well hang x3 min x3 min X 3 min x3 min X3 min 3 min X3 min       Treatment/Session Summary:    Treatment Assessment:   Pt demonstrates improved body mechanics and trunk control with exercises. She performed the walking lunges without increased pain. Communication/Consultation:  None today  Equipment provided today:  None  Recommendations/Intent for next treatment session: Next visit will focus on strengthening in the pool.     Total Treatment Billable Duration:  45 minutes   Time In: 1110  Time Out: 1200    Glena Conway, PTA       Charge Capture  }Post Session Pain  PT Visit Info  [employer] Portal  MD Guidelines  Scanned Media  Benefits  MyChart    Future Appointments   Date Time Provider Heber Lockett   8/9/2022 10:30 AM Glena Conway, PTA [facility]   8/11/2022 11:00 AM Taylor Stafford, PT [facility]   8/15/2022 11:15 AM Glena Conway, PTA [facility]   8/18/2022 11:15 AM Glena Conway, PTA [facility]   8/22/2022 11:15 AM Glena Conway, PTA [facility]   8/24/2022 11:15 AM Glena Conway, PTA [facility]

## 2022-08-09 ENCOUNTER — HOSPITAL ENCOUNTER (OUTPATIENT)
Dept: PHYSICAL THERAPY | Age: 62
Setting detail: RECURRING SERIES
Discharge: HOME | End: 2022-08-12
Payer: COMMERCIAL

## 2022-08-09 PROCEDURE — 97113 AQUATIC THERAPY/EXERCISES: CPT

## 2022-08-09 ASSESSMENT — PAIN SCALES - GENERAL: PAINLEVEL_OUTOF10: 0

## 2022-08-09 NOTE — PROGRESS NOTES
Date  8/9/22   Aquatic exercise Parameters: in 3.5 ft water Parameters: in 3.5 ft water  Parameters in 3,5 depth Parameters: 3.5 ft water Parameters: 3.5 ft water Parameters:   3.5 ft water Parameters: 3.5 ft water Parameters: in 3.5 ft water   Lateral walk x3 laps x3 laps with open paddles X 3 with open paddles x3 laps open paddles X3 laps partially closed paddles X3 laps partially closed  paddles X3 laps with paddles in half squat X3 laps with pink dumbbells   Forward walk X 4 laps X 4 laps x3 laps x3 laps X3 laps Pt walked around the pool deck prior, several laps Pt walked in the 4.5 ft side several laps X3 laps   Tandem stance x3 laps with one hand on bar for balance x3 laps with cues to gaze forward X 3 laps  X3 laps with one hand on bar X3 laps with one hand on bar     Walking lunges       X3 laps with one hand on bar X3 laps   Retro walk X 3 laps x3 laps X 3 laps X 3 laps X3 laps X3 laps with partially closed paddles  X3 laps   marching x3 laps x3 laps with open paddles X 3 laps x3 laps with open paddles X3 laps partially closed paddles X3 laps with dumbbells  X3 laps with pink dumbbells   Stretching for lumbar/thoracic spine  Spinal flexion/extension with noodle x10    Standing thoracic rotation x10 B with noodle Spinal flex/ext with noodle x 10      Standing thoracic rotation x 10 B with noodle Spine flexion/ext x15 with noodle    Thoracic rotation x15 B with noodle Spinal flexion/ext x15 with noodle    Thoracic rotation x15 B with noodle Spinal flexion/ext x15 with noodle    Thoracic rotation B x15 with noodle Spinal flexion/ext x15 yellow noodle    Thoracic rotation B x15 with noodle    Chin tucks  x10 seated    Scapula retraction 2x10 seated X 10    scap retraction- 10 x10 standing    2x10 scapula retraction X10 standing    2x10 scapula retraction X10 standing    2x10 scapula retraction with paddles X10 in standing    Hip abduction      Hip 3 way x10 each B in 4.5 ft water X30  each B in 4.5 ft water (hip flexion, ext, and abduction) Hip 3 way x15 each direction, BLE. With cues for posterior pelvic tilt   Heel raises           UE movement with narrow stance for balance Flexion/ext x30    Rows x20    Shoulder abduction x30        In 4.5 ft water  Rows x30  Shoulder ABD x30 with closed paddles    Step ups    x15 B with UE support onto red box  4.5 ft steps- ascend and descend x10 with reciprocal gait     Standing posterior pelvic tilt        X10 against wall   Lunge stretch 20 sec hold x4 B    20 sec hold x 4 B 20 sec hold x 4 B 20 sec hold x 4 B 20 sec hold x 4 B 20 sec hold x4 B with cues for posterior pelvic tilt   Deep well  With noodle With noodle With noodle  With noodle With noodle With noodle With yellow noodle With yellow noodle   Single knee to chest with abdominal stabilization    x15 each LE Hip circles x10 ea direction      bicycle x3 min x3 min X 3 min x30 x30 x30 x30 x30   Hip abduction/adduction x30 x30 X 30 x30 x30 x30 x30 x30   Hip flexion/ext  x30 X 30 x30 x30 x30 x30 x30   Deep well hang x3 min x3 min X 3 min x3 min X3 min 3 min X3 min X3 min       Treatment/Session Summary:    Treatment Assessment:   Pt demonstrates good body mechanics with lunges. Pt had moderate difficulty with the posterior pelvic tilt and was reminded to practice them at home. Communication/Consultation:  None today  Equipment provided today:  None  Recommendations/Intent for next treatment session: Next visit will focus on strengthening in the pool.     Total Treatment Billable Duration:  45 minutes   Time In: 8468  Time Out: 1115    Gabriele Irvin PTA       Charge Capture  }Post Session Pain  PT Visit Info  [employer] Portal  MD Guidelines  Scanned Media  Benefits  MyChart    Future Appointments   Date Time Provider Heber Lockett   8/11/2022 11:00 AM Taylor Hawk, PT [facility]   8/15/2022 11:15 AM Gabriele Irvin PTA [facility]   8/18/2022 11:15 AM Gabriele Irvin PTA [facility]   8/22/2022 11:15 SHERIE Santillan PTA [facility]   8/24/2022 11:15 SHERIE Santillan PTA [facility]

## 2022-08-11 ENCOUNTER — HOSPITAL ENCOUNTER (OUTPATIENT)
Dept: PHYSICAL THERAPY | Age: 62
Setting detail: RECURRING SERIES
Discharge: HOME | End: 2022-08-14
Payer: COMMERCIAL

## 2022-08-11 PROCEDURE — 97113 AQUATIC THERAPY/EXERCISES: CPT

## 2022-08-11 NOTE — PROGRESS NOTES
Jose Hernandez  : 1960  Primary: Medicare Part A And B  Secondary: [place] @ [address] [place] [address] Way [address]  Phone: [phone]  Fax: [phone] Plan Frequency: 2/week x 12 weeks    Plan of Care/Certification Expiration Date: 22      PT Visit Info: Total # of Visits to Date: 15      OUTPATIENT PHYSICAL THERAPY:OP NOTE TYPE: Treatment Note 2022       Episode  }Appt Desk              Treatment Diagnosis: Difficulty in walking, Not elsewhere classified (R26.2)  Low Back Pain (M54.5) Pain in right hip (M25. 551)  Medical/Referring Diagnosis:  Spondylosis without myelopathy or radiculopathy, lumbosacral region [M47.817]  Chronic pain syndrome [G89.4]  Unilateral primary osteoarthritis, unspecified hip [M16.10]  Referring Physician:  Elton Chavis*  MD Orders:  PT Eval and Treat   Date of Onset:  Onset Date: 09/15/15     Allergies:   Patient has no allergy information on record. Restrictions/Precautions:  Restrictions/Precautions: None  No data recorded   Interventions Planned (Treatment may consist of any combination of the following):    Current Treatment Recommendations: Strengthening; ROM; Manual Therapy - Soft Tissue Mobilization; Manual Therapy - Joint Manipulation; Pain management; Home exercise program; Aquatics     Subjective Comments:       Initial:}   2  /10Post Session:     2     Medications Last Reviewed:  2022  Updated Objective Findings:   progress note 22  Treatment   AQUATIC EXERCISE: (45 minutes):    Exercises per grid below to improve mobility, strength and balance. Required MOD visual and verbal cues to promote proper body mechanics. Progressed repetitions and complexity of movement as indicated.       Date  22 Date  22   Aquatic exercise Parameters: in 3.5 ft water Parameters in 3.5 water   Lateral walk X3 laps with pink dumbbells X 3 laps with dumbells   Forward walk X3 laps X 3 laps   Tandem stance     Walking lunges X3 laps    Retro walk X3 laps X 3 laps   marching X3 laps with pink dumbbells X 3 laps   Stretching for lumbar/thoracic spine     Chin tucks     Hip abduction Hip 3 way x15 each direction, BLE. With cues for posterior pelvic tilt Hip 3 way x 15 B LE with cues for posterior pelvic tilt   Heel raises     UE movement with narrow stance for balance     Step ups     Standing posterior pelvic tilt X10 against wall X 10 against wall   Lunge stretch 20 sec hold x4 B with cues for posterior pelvic tilt    Deep well  With yellow noodle With yellow noodle   Single knee to chest with abdominal stabilization     bicycle x30 X 30   Hip abduction/adduction x30 X 30   Hip flexion/ext x30 X 30   Deep well hang X3 min    Stretches on step :calf HS hip flex  5 x 10 sec    Treatment/Session Summary:    Treatment Assessment: doing well in the pool,  Has made progress in pool and with function. States stairs are easier and she can stand long enough to make a simple meal on most days. Communication/Consultation:  None today   Equipment provided today:  None  Recommendations/Intent for next treatment session: Next visit will focus on strengthening in the pool.     Total Treatment Billable Duration:  45 minutes   Time In: 1100  Time Out: 5252    Jason Ann, PT       Charge Capture  }Post Session Pain  PT Visit Info  [employer] Portal  MD Guidelines  Scanned Media  Benefits  MyChart    Future Appointments   Date Time Provider Heber Lockett   8/15/2022 11:15 AM [facility]   8/18/2022 11:15 AM [facility]   8/22/2022 11:15 AM Loree Sicard, PTA [facility]   8/24/2022 11:15 AM Loree Sicard, PTA [facility]

## 2022-08-11 NOTE — PROGRESS NOTES
Beverley Krueger  : 1960  Primary: Medicare Part A And B  Secondary: [place] @ [facility] [address] [address]  Phone: [phone]  Fax: [phone] Plan Frequency: 2/week x 12 weeks    Plan of Care/Certification Expiration Date: 22      PT Visit Info: Total # of Visits to Date: [address] report OP NOTE TYPE:  2022       Episode  }Appt Desk              Treatment Diagnosis: Difficulty in walking, Not elsewhere classified (R26.2)  Low Back Pain (M54.5) Pain in right hip (M25. 551)  Medical/Referring Diagnosis:  Spondylosis without myelopathy or radiculopathy, lumbosacral region [M47.817]  Chronic pain syndrome [G89.4]  Unilateral primary osteoarthritis, unspecified hip [M16.10]  Referring Physician:  Migel Page MD Orders:  PT Eval and Treat   Date of Onset:  Onset Date: 09/15/15     Allergies:   Patient has no allergy information on record. Restrictions/Precautions:  Restrictions/Precautions: None  No data recorded   Interventions Planned (Treatment may consist of any combination of the following):    Current Treatment Recommendations: Strengthening; ROM; Manual Therapy - Soft Tissue Mobilization; Manual Therapy - Joint Manipulation; Pain management; Home exercise program; Aquatics     Subjective Comments I can tell I am stronger   Initial:}  2   /10Post Session:     2   /10  Medications Last Reviewed:  2022  Updated Objective Findings:  Pt has attended 14 visits of PT - aquatic therapy. She has made progress and is on track to continue making progress to reach goals below. She missed a couple of visits as she was out of town. Plan to transition to one pool vist and one land visit each week in order to progress to land activties.   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 Not tested  Improve Oswestry score by 10 or greater indicating improved function. Not tested  Improve hip flexibility to accommodate longer stepping  Ongoing 8/11/22  Up/down  6 stairs with hand railing with greater ease with step over step gait.  MET 8/11/22  [id number]}  Time In: 1100  Time Out: 1145    Taylor Coleman, PT       Charge Capture  }Post Session Pain  PT Visit Info  [employer] Portal  MD Guidelines  Scanned Media  Benefits  MyChart    Future Appointments   Date Time Provider Heber Lockett   8/15/2022 11:15 AM [facility]   8/18/2022 11:15 AM Bandar Dyer, [facility]   8/22/2022 11:15 AM Valley Brkelli PTA [facility]   8/24/2022 11:15 AM Valley Brick, PTA [facility]

## 2022-08-11 NOTE — THERAPY RECERTIFICATION
Stacie Gupta  : 1960  Primary: Medicare Part A And B  Secondary: [place] @ [address] [address]  Phone: [phone]  Fax: [phone] Plan Frequency: 2/week x 12 weeks    Plan of Care/Certification Expiration Date: 22      PT Visit Info: Total # of Visits to Date: 1200 [facility]                Episode  Appt Desk         Treatment Diagnosis:  Difficulty in walking, Not elsewhere classified (R26.2)  Low Back Pain (M54.5) Pain in right hip (M25. 551)  Medical/Referring Diagnosis:  Spondylosis without myelopathy or radiculopathy, lumbosacral region [M47.817]  Chronic pain syndrome [G89.4]  Unilateral primary osteoarthritis, unspecified hip [M16.10]  Referring Physician:  Andrei Ramírez MD Orders:  PT Eval and Treat Aquatics  Return MD Appt:    Date of Onset:  Onset Date: 09/15/15     Allergies:  Patient has no allergy information on record. Restrictions/Precautions:    Restrictions/Precautions: None  No data recorded   Medications Last Reviewed:  2022     SUBJECTIVE   History of Injury/Illness (Reason for Referral):  Pt has attended 14 visits of aqautic therapy and has made improvements in strength, functional mobility and pain control. She is on track to reach long term goals with extended therapy. We will begin one land visit and one pool visit per week. Initial:     2 /10 Post Session:    2  /10       OBJECTIVE   Trunk ROM-  Trunk flex- finger tips to shin pain in R low back and down L LE                         Ext- to neutral painful                         Side bending - limited to 50%  Painful both directions  Seated LE strength test [facility]  Limited hip flexion ROM in sitting  Timed up and go- 12.5 seconds not retested today  Gait - Improved lima and step length with gait.               Stairs- used reciprocal gait for 3 stairs with moderate use of UE's    ASSESSMENT   Assessment: Pt improving in strength and functional activities. Has met some goals each progress note. On track to meet more goals with continued therapy   Problem List: (Impacting functional limitations): Body Structures, Functions, Activity Limitations Requiring Skilled Therapeutic Intervention: Decreased functional mobility ; Decreased ROM; Decreased strength; Increased pain; Decreased posture        PLAN   Effective Dates: 6/15/22 TO Plan of Care/Certification Expiration Date: 11/09/22     Frequency/Duration: Plan Frequency: 2/week x 12 weeks     Interventions Planned (Treatment may consist of any combination of the following):    Current Treatment Recommendations: Strengthening; ROM; Manual Therapy - Soft Tissue Mobilization; Manual Therapy - Joint Manipulation; Pain management; Home exercise program; Aquatics     Goals: (Goals have been discussed and agreed upon with patient.)  Short-Term Functional Goals: Time Frame: 3-4 weeks  Pt to perform 45 minutes of aquatic therapy 2/week consistently  MET  Pt to report a decrease in pain either intensity or frequency  MET    Discharge Goals: Time Frame: 9-12 weeks  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 ongoing  Improve Oswestry score by 10 or greater indicating improved function. ongoing  Improve hip flexibility to accommodate longer stepping ongoing  Up/down  6 stairs with hand railing with greater ease with step over step gait. ongoing         Outcome Measure: Tool Used: Modified Oswestry Low Back Pain   Score:  Initial: 28/50  Most Recent: X/50 (Date: -- )   Interpretation of Score: Each section is scored on a 0-5 scale, 5 representing the greatest disability. The scores of each section are added together for a total score of 50.       Tool Used: Timed Up and Go (TUG)  Score:  Initial: 12.5 seconds Most Recent: X seconds (Date: -- ) Interpretation of Score: The test measures, in seconds, the time taken by an individual to stand up from a standard arm chair (seat height 46 cm [18 in], arm height 65 cm [25.6 in]), walk a distance of 3 meters (118 in, approx 10 ft), turn, walk back to the chair and sit down. If the individual takes longer than 14 seconds to complete TUG, this indicates risk for falls. Medical Necessity:   Patient is expected to demonstrate progress in strength, range of motion, balance and coordination to improve safety during ambulation and stair use. Also improve endurance for cooking and d=adls. .  Reason For Services/Other Comments:      Regarding Cristina Kowalski's therapy, I certify that the treatment plan above will be carried out by a therapist or under their direction.   Thank you for this referral,  Aris Marshall, PT     Referring Physician Signature: Elsa Soriano* _______________________________ Date _____________        Post Session Pain  Charge Capture  PT Visit Info  POC Link  Treatment Note Link  MD Guidelines  [place]

## 2022-08-15 ENCOUNTER — HOSPITAL ENCOUNTER (OUTPATIENT)
Dept: PHYSICAL THERAPY | Age: 62
Setting detail: RECURRING SERIES
Discharge: HOME | End: 2022-08-18
Payer: COMMERCIAL

## 2022-08-15 PROCEDURE — 97113 AQUATIC THERAPY/EXERCISES: CPT

## 2022-08-15 ASSESSMENT — PAIN SCALES - GENERAL: PAINLEVEL_OUTOF10: 3

## 2022-08-15 NOTE — PROGRESS NOTES
Lorin Basurto  : 1960  Primary: Medicare Part A And B  Secondary: [place] @ [address] [address]  Phone: [phone]  Fax: [phone] Plan Frequency: 2/week x 12 weeks    Plan of Care/Certification Expiration Date: 22      PT Visit Info: Total # of Visits to Date: 13      OUTPATIENT PHYSICAL THERAPY:OP NOTE TYPE: Treatment Note 8/15/2022       Episode  }Appt Desk              Treatment Diagnosis: Difficulty in walking, Not elsewhere classified (R26.2)  Low Back Pain (M54.5) Pain in right hip (M25. 551)  Medical/Referring Diagnosis:  Spondylosis without myelopathy or radiculopathy, lumbosacral region [M47.817]  Chronic pain syndrome [G89.4]  Unilateral primary osteoarthritis, unspecified hip [M16.10]  Referring Physician:  Billy Adler MD Orders:  PT Eval and Treat   Date of Onset:  Onset Date: 09/15/15     Allergies:   Patient has no allergy information on record. Restrictions/Precautions:  Restrictions/Precautions: None  No data recorded   Interventions Planned (Treatment may consist of any combination of the following):    Current Treatment Recommendations: Strengthening; ROM; Manual Therapy - Soft Tissue Mobilization; Manual Therapy - Joint Manipulation; Pain management; Home exercise program; Aquatics     Subjective Comments:    Pt reports lumbar pain on the R side. Initial:}    3/10Post Session:      3/10  Medications Last Reviewed:  8/15/2022  Updated Objective Findings:   progress note 22  Treatment   AQUATIC EXERCISE: (45 minutes):    Exercises per grid below to improve mobility, strength and balance. Required MOD visual and verbal cues to promote proper body mechanics. Progressed repetitions and complexity of movement as indicated.       Date  22 Date  22 Date  8/15/22   Aquatic exercise Parameters: in 3.5 ft water Parameters in 3.5 water Parameters: 3.5 ft water   Lateral walk X3 laps with pink dumbbells X 3 laps with dumbells X3 laps with pink dumbbells   Forward walk X3 laps X 3 laps X3 laps   Tandem stance      Walking lunges X3 laps  X3 laps   Retro walk X3 laps X 3 laps X3 laps with pink dumbbells   marching X3 laps with pink dumbbells X 3 laps X3 laps pink dumbbells   Goose step   X3 laps pink dumbbells   Stretching for lumbar/thoracic spine   At bar x10   Chin tucks      Hip abduction Hip 3 way x15 each direction, BLE. With cues for posterior pelvic tilt Hip 3 way x 15 B LE with cues for posterior pelvic tilt    Heel raises      UE movement with narrow stance for balance      Step ups      Standing posterior pelvic tilt X10 against wall X 10 against wall    Lunge stretch 20 sec hold x4 B with cues for posterior pelvic tilt  20 sec hold x 4 B    Deep well  With yellow noodle With yellow noodle Yellow noodle   Single knee to chest with abdominal stabilization      bicycle x30 X 30 x30   Hip circles   X15 B   Hip abduction/adduction x30 X 30 x30   Hip flexion/ext x30 X 30 x30   Deep well hang X3 min  X3 min   Stretches on step: hamstring 20 sec hold x 4 B    Treatment/Session Summary:    Treatment Assessment:   Next visit will be on land. Pt is making good progress with trunk stability exercises but reported mild fatigue. Communication/Consultation:  None today   Equipment provided today:  None  Recommendations/Intent for next treatment session: Next visit will focus on strengthening in the pool.     Total Treatment Billable Duration:  45 minutes   Time In: 0312  Time Out: 1200    Grant Castorena PTA       Charge Capture  }Post Session Pain  PT Visit Info  [employer] Portal  MD Guidelines  Scanned Media  Benefits  MyChart    Future Appointments   Date Time Provider Heber Lockett   8/18/2022 11:15 AM Grant Castorena PTA [facility]   8/22/2022 11:15 AM Grant Castorena PTA [facility]   8/24/2022 11:15 AM [facility]

## 2022-08-18 ENCOUNTER — HOSPITAL ENCOUNTER (OUTPATIENT)
Dept: PHYSICAL THERAPY | Age: 62
Setting detail: RECURRING SERIES
Discharge: HOME | End: 2022-08-21
Payer: COMMERCIAL

## 2022-08-18 PROCEDURE — 97110 THERAPEUTIC EXERCISES: CPT

## 2022-08-18 ASSESSMENT — PAIN SCALES - GENERAL: PAINLEVEL_OUTOF10: 0

## 2022-08-18 NOTE — PROGRESS NOTES
Radha Herron  : 1960  Primary: Medicare Part A And B  Secondary: [place] @ [facility] [address]  Phone: [phone]  Fax: [phone] Plan Frequency: 2/week x 12 weeks    Plan of Care/Certification Expiration Date: 22      PT Visit Info: Total # of Visits to Date: 12      OUTPATIENT PHYSICAL THERAPY:OP NOTE TYPE: Treatment Note 2022       Episode  }Appt Desk              Treatment Diagnosis: Difficulty in walking, Not elsewhere classified (R26.2)  Low Back Pain (M54.5) Pain in right hip (M25. 551)  Medical/Referring Diagnosis:  Spondylosis without myelopathy or radiculopathy, lumbosacral region [M47.817]  Chronic pain syndrome [G89.4]  Unilateral primary osteoarthritis, unspecified hip [M16.10]  Referring Physician:  Patsy Bell MD Orders:  PT Eval and Treat   Date of Onset:  Onset Date: 09/15/15     Allergies:   Patient has no allergy information on record. Restrictions/Precautions:  Restrictions/Precautions: None  No data recorded   Interventions Planned (Treatment may consist of any combination of the following):    Current Treatment Recommendations: Strengthening; ROM; Manual Therapy - Soft Tissue Mobilization; Manual Therapy - Joint Manipulation; Pain management; Home exercise program; Aquatics     Subjective Comments:    Pt states that the back is worse in the morning and she had shooting pain down the legs this morning. Initial:}    0/10Post Session:      0/10  Medications Last Reviewed:  2022  Updated Objective Findings:   progress note 22  Treatment   THERAPEUTIC EXERCISE: (40 minutes):    Exercises per grid below to improve mobility, strength and balance. Required MOD visual and verbal cues to promote proper body mechanics. Progressed repetitions and complexity of movement as indicated. Pt education on log rolling.  Log rolling was demonstrated for the pt and she practiced log rolling 3 times. Pt was given a handout. -supine pelvic tilt x15  -supine lower trunk rotation with stability ball x30 B  -supine bridges 2x10  -side lying B clamshells 3x10  -SLR B 3x10 each  -sit to stand x10 from mat with verbal cues to eccentrically lower to the mat table  -standing lunge stretch for hip flexors- 10 sec hold x 4 B in doorway      Treatment/Session Summary:    Treatment Assessment:   Pt reported left groin pain with the LEs on the stability. She required frequent verbal and tactile cues to perform activities with correct body mechanics. She is weak with hip abduction. Communication/Consultation:  Pt was issued a handout on log rolling 8/18/22. Equipment provided today:  None  Recommendations/Intent for next treatment session: Next visit will focus on strengthening the hip and LE's.     Total Treatment Billable Duration:  40 minutes   Time In: 1120  Time Out: 1200    Edgardo Del Rio PTA       Charge Capture  }Post Session Pain  PT Visit Info  [employer] Portal  MD Guidelines  Scanned Media  Benefits  MyChart    Future Appointments   Date Time Provider Heber Lockett   8/22/2022 11:15 AM Edgardo Del Rio PTA [facility]   8/24/2022 11:15 AM Edgardo Del Rio PTA [facility]

## 2022-08-22 ENCOUNTER — HOSPITAL ENCOUNTER (OUTPATIENT)
Dept: PHYSICAL THERAPY | Age: 62
Setting detail: RECURRING SERIES
Discharge: HOME | End: 2022-08-25
Payer: COMMERCIAL

## 2022-08-22 PROCEDURE — 97110 THERAPEUTIC EXERCISES: CPT

## 2022-08-22 ASSESSMENT — PAIN SCALES - GENERAL: PAINLEVEL_OUTOF10: 3

## 2022-08-22 NOTE — PROGRESS NOTES
Arash Capps  : 1960  Primary: Medicare Part A And B  Secondary: [place] @ [address] [address]  Phone: [phone]  Fax: [phone] Plan Frequency: 2/week x 12 weeks    Plan of Care/Certification Expiration Date: 22      PT Visit Info: Total # of Visits to Date: 16      OUTPATIENT PHYSICAL THERAPY:OP NOTE TYPE: Treatment Note 2022       Episode  }Appt Desk              Treatment Diagnosis: Difficulty in walking, Not elsewhere classified (R26.2)  Low Back Pain (M54.5) Pain in right hip (M25. 551)  Medical/Referring Diagnosis:  Spondylosis without myelopathy or radiculopathy, lumbosacral region [M47.817]  Chronic pain syndrome [G89.4]  Unilateral primary osteoarthritis, unspecified hip [M16.10]  Referring Physician:  Katiuska Eng MD Orders:  PT Eval and Treat   Date of Onset:  Onset Date: 09/15/15     Allergies:   Patient has no allergy information on record. Restrictions/Precautions:  Restrictions/Precautions: None  No data recorded   Interventions Planned (Treatment may consist of any combination of the following):    Current Treatment Recommendations: Strengthening; ROM; Manual Therapy - Soft Tissue Mobilization; Manual Therapy - Joint Manipulation; Pain management; Home exercise program; Aquatics     Subjective Comments:    Pt reports leg pain. Initial:}    3/10Post Session:      3/10  Medications Last Reviewed:  2022  Updated Objective Findings:   progress note 22  Treatment   THERAPEUTIC EXERCISE: (45 minutes):    Exercises per grid below to improve mobility, strength and balance. Required MOD visual and verbal cues to promote proper body mechanics. Progressed repetitions and complexity of movement as indicated. Pt demonstrated good log rolling technique.     -supine pelvic tilt x15  -supine lower trunk rotation with stability ball x30 B  -supine bridges 3x10 on stability ball  -side lying B clamshells 2x10  -side lying hip abduction x10 bilateral  -SLR B 2x10 each  -supine hamstring stretch 1 min hold x 4 B with strap  -standing cat/camel lumbar stretch with ball x10   -standing lunge stretch for hip flexors- 20 sec hold x 4 B in doorway      Treatment/Session Summary:    Treatment Assessment:   Pt reported less pain with activity today but fatigues easily with LE strengthening. Plan to begin prone activities next visit. Communication/Consultation:  Pt was issued a handout on log rolling 8/18/22. Equipment provided today:  None  Recommendations/Intent for next treatment session: Next visit will focus on strengthening the hip and LE's.     Total Treatment Billable Duration:  45 minutes   Time In: 2718  Time Out: 1200    Roxine Meth, PTA       Charge Capture  }Post Session Pain  PT Visit Info  [employer] Portal  MD Guidelines  Scanned Media  Benefits  MyChart    Future Appointments   Date Time Provider Heber Lockett   8/24/2022 11:15 AM [facility]

## 2022-08-24 ENCOUNTER — HOSPITAL ENCOUNTER (OUTPATIENT)
Dept: PHYSICAL THERAPY | Age: 62
Setting detail: RECURRING SERIES
Discharge: HOME | End: 2022-08-27
Payer: COMMERCIAL

## 2022-08-24 PROCEDURE — 97110 THERAPEUTIC EXERCISES: CPT

## 2022-08-24 ASSESSMENT — PAIN SCALES - GENERAL: PAINLEVEL_OUTOF10: 2

## 2022-08-24 NOTE — PROGRESS NOTES
Stacie Gupta  : 1960  Primary: Medicare Part A And B  Secondary: [place] @ [facility] [address] [address]  Phone: [phone]  Fax: [phone] Plan Frequency: 2/week x 12 weeks    Plan of Care/Certification Expiration Date: 22      PT Visit Info: Total # of Visits to Date: 25      OUTPATIENT PHYSICAL THERAPY:OP NOTE TYPE: Treatment Note 2022       Episode  }Appt Desk              Treatment Diagnosis: Difficulty in walking, Not elsewhere classified (R26.2)  Low Back Pain (M54.5) Pain in right hip (M25. 551)  Medical/Referring Diagnosis:  Spondylosis without myelopathy or radiculopathy, lumbosacral region [M47.817]  Chronic pain syndrome [G89.4]  Unilateral primary osteoarthritis, unspecified hip [M16.10]  Referring Physician:  Kathya Mcclendon MD Orders:  PT Eval and Treat   Date of Onset:  Onset Date: 09/15/15     Allergies:   Patient has no allergy information on record. Restrictions/Precautions:  Restrictions/Precautions: None  No data recorded   Interventions Planned (Treatment may consist of any combination of the following):    Current Treatment Recommendations: Strengthening; ROM; Manual Therapy - Soft Tissue Mobilization; Manual Therapy - Joint Manipulation; Pain management; Home exercise program; Aquatics     Subjective Comments:    Pt reports R sided pain. Initial:}    2/10Post Session:      2/10  Medications Last Reviewed:  2022  Updated Objective Findings:   progress note 22  Treatment   THERAPEUTIC EXERCISE: (45 minutes):    Exercises per grid below to improve mobility, strength and balance. Required MOD visual and verbal cues to promote proper body mechanics. Progressed repetitions and complexity of movement as indicated. Log rolling demonstrated by pt and therapist corrected technique.   Muscle energy technique for sacroliliac joint- push feet into wall, hip adduction, and hip abduction in hooklying, then bridge    -supine lower trunk rotation with stability ball x30 B  -supine bridges 3x10 on stability ball  -standing cat/camel lumbar stretch with ball x10   -side lying Abraham stretch 1 min hold x 4 B  -supine hamstring stretch 1 min hold x 4 B      Treatment/Session Summary:    Treatment Assessment:   Pt reported releif after exercise but her hip muscles are tight and limited. Communication/Consultation:  Pt issued the MET technique  Equipment provided today: blue theraband  Recommendations/Intent for next treatment session: Next visit will focus on strengthening the hip and LE's.     Total Treatment Billable Duration:  45 minutes   Time In: 1579  Time Out: 1200    Roxine Meth, PTA       Charge Capture  }Post Session Pain  PT Visit Info  [email or web address] Portal  MD Guidelines  Scanned Media  Benefits  MyChart    Future Appointments   Date Time Provider Heber Lockett   8/30/2022 11:15 AM Roxine Meth, PTA [facility]   9/1/2022 11:00 AM Roxine Meth, PTA [facility]   9/6/2022 11:15 AM Roxine Meth, P[facility]   9/8/2022 11:15 AM Taylor Valla Gram, PT [facility]   9/13/2022 11:15 AM Roxine Meth, PTA [facility]   9/15/2022 11:15 AM Roxine Meth, PTA [facility]

## 2022-08-30 ENCOUNTER — HOSPITAL ENCOUNTER (OUTPATIENT)
Dept: PHYSICAL THERAPY | Age: 62
Setting detail: RECURRING SERIES
Discharge: HOME | End: 2022-09-02
Payer: COMMERCIAL

## 2022-08-30 PROCEDURE — 97110 THERAPEUTIC EXERCISES: CPT

## 2022-08-30 ASSESSMENT — PAIN SCALES - GENERAL: PAINLEVEL_OUTOF10: 3

## 2022-08-30 NOTE — PROGRESS NOTES
Guillermo Cox  : 1960  Primary: Medicare Part A And B  Secondary: [place] @ [address] [place] [address] Way [address]  Phone: [phone]  Fax: [phone] Plan Frequency: 2/week x 12 weeks    Plan of Care/Certification Expiration Date: 22      PT Visit Info: Total # of Visits to Date: 23      OUTPATIENT PHYSICAL THERAPY:OP NOTE TYPE: Treatment Note 2022       Episode  }Appt Desk              Treatment Diagnosis: Difficulty in walking, Not elsewhere classified (R26.2)  Low Back Pain (M54.5) Pain in right hip (M25. 551)  Medical/Referring Diagnosis:  Spondylosis without myelopathy or radiculopathy, lumbosacral region [M47.817]  Chronic pain syndrome [G89.4]  Unilateral primary osteoarthritis, unspecified hip [M16.10]  Referring Physician:  Smith Quinones MD Orders:  PT Eval and Treat   Date of Onset:  Onset Date: 09/15/15     Allergies:   Patient has no allergy information on record. Restrictions/Precautions:  Restrictions/Precautions: None  No data recorded   Interventions Planned (Treatment may consist of any combination of the following):    Current Treatment Recommendations: Strengthening; ROM; Manual Therapy - Soft Tissue Mobilization; Manual Therapy - Joint Manipulation; Pain management; Home exercise program; Aquatics     Subjective Comments:    Pt states \"I have some pain on the left side. \"  Initial:}    3/10Post Session:      3/10  Medications Last Reviewed:  2022  Updated Objective Findings:   progress note 22  Treatment   THERAPEUTIC EXERCISE: (45 minutes):    Exercises per grid below to improve mobility, strength and balance. Required MOD visual and verbal cues to promote proper body mechanics. Progressed repetitions and complexity of movement as indicated.      Muscle energy technique for sacroliliac joint- push feet into wall, hip adduction, and hip abduction with blue band in hooklying    -side lying B clamshells 3x10 blue band  -side lying B abduction 2x10  -supine lower trunk rotation with stability ball x30 B  -supine bridges 3x10 with blue band  -pelvic tilts x15  -side lying Abraham stretch 1 min hold x 4 B  -supine hamstring stretch 1 min hold x 4 B  -supine figure 4 stretch 30 sec hold x 2 B  -sit to stand x10 with tactile cues to not adduct hips       Treatment/Session Summary:    Treatment Assessment:   Pt's hip flexors and adductors are tight. She is very weak with hip abduction. Communication/Consultation:  HEP reviewed with the pt  Equipment provided today: blue theraband  Recommendations/Intent for next treatment session: Next visit will focus on strengthening the hip and LE's. Continue to progress functional strengthening.     Total Treatment Billable Duration:  45 minutes   Time In: 1110  Time Out: 1155    Edgardo Del Rio PTA       Charge Capture  }Post Session Pain  PT Visit Info  [employer] Portal  MD Guidelines  Scanned Media  Benefits  MyChart    Future Appointments   Date Time Provider Heber Lockett   9/1/2022 11:00 AM Edgardo Del Rio PTA [facility]   9/6/2022 11:15 AM ORLANDO MadrigalO   9/8/2022 11:15 AM Taylor Victoria, CAR[facility]OTA GRIFFIN [facility]   9/13/2022 11:15 AM ORLANDO MadrigalO   9/15/2022 11:15 AM ORLANDO MadrigalORPPAUL O

## 2022-09-01 ENCOUNTER — HOSPITAL ENCOUNTER (OUTPATIENT)
Dept: PHYSICAL THERAPY | Age: 62
Setting detail: RECURRING SERIES
Discharge: HOME | End: 2022-09-04
Payer: COMMERCIAL

## 2022-09-01 PROCEDURE — 97110 THERAPEUTIC EXERCISES: CPT

## 2022-09-01 ASSESSMENT — PAIN SCALES - GENERAL: PAINLEVEL_OUTOF10: 3

## 2022-09-01 NOTE — PROGRESS NOTES
Trini Barbosa  : 1960  Primary: Medicare Part A And B  Secondary: [place] @ [facility] [address] [address]  Phone: [phone]  Fax: [phone] Plan Frequency: 2/week x 12 weeks    Plan of Care/Certification Expiration Date: 22      PT Visit Info: Total # of Visits to Date: 21      OUTPATIENT PHYSICAL THERAPY:OP NOTE TYPE: Treatment Note 2022       Episode  }Appt Desk              Treatment Diagnosis: Difficulty in walking, Not elsewhere classified (R26.2)  Low Back Pain (M54.5) Pain in right hip (M25. 551)  Medical/Referring Diagnosis:  Spondylosis without myelopathy or radiculopathy, lumbosacral region [M47.817]  Chronic pain syndrome [G89.4]  Unilateral primary osteoarthritis, unspecified hip [M16.10]  Referring Physician:  Jeralyn Gilford* MD Orders:  PT Eval and Treat   Date of Onset:  Onset Date: 09/15/15     Allergies:   Patient has no allergy information on record. Restrictions/Precautions:  Restrictions/Precautions: None  No data recorded   Interventions Planned (Treatment may consist of any combination of the following):    Current Treatment Recommendations: Strengthening; ROM; Manual Therapy - Soft Tissue Mobilization; Manual Therapy - Joint Manipulation; Pain management; Home exercise program; Aquatics     Subjective Comments:    Pt reports R hip pain that radiates down the R leg. The pain originates in the R piriformis. Initial:}    3/10Post Session:      3/10, pt reports less tightness  Medications Last Reviewed:  2022  Updated Objective Findings:   progress note 22  Treatment   THERAPEUTIC EXERCISE: (45 minutes):    Exercises per grid below to improve mobility, strength and balance. Required MOD visual and verbal cues to promote proper body mechanics.   Progressed repetitions and complexity of movement as indicated.     -side lying B clamshells 3x10 blue band  -side lying B abduction 2x10 with tactile cues  -supine bridges 3x10 with blue band  -pelvic tilts x15  -side lying Abraham stretch 1 min hold x 4 B off the edge of the table  -supine hamstring stretch 1 min hold x 4 B  -supine figure 4 stretch 30 sec hold x 2 B  -single knee to chest stretch 10 sec hold x 6 B alternating    Pt educated on using a pillow between her knees and under her arm for side sleeping. Treatment/Session Summary:    Treatment Assessment:   Pt was educated on proper sleeping position for the side. She reports pain in the hip while sleeping in side lying. Communication/Consultation:  HEP reviewed with the pt- added the stretches  Equipment provided today: none  Recommendations/Intent for next treatment session: Next visit will focus on strengthening the hip and LE's. Continue to progress functional strengthening.     Total Treatment Billable Duration:  45 minutes   Time In: 1844  Time Out: 1200    Edgardo Del Rio PTA       Charge Capture  }Post Session Pain  PT Visit Info  [employer] Portal  MD Guidelines  Scanned Media  Benefits  MyChart    Future Appointments   Date Time Provider Heber Lockett   9/6/2022 11:15 AM Edgardo Del Rio PTA [facility]   9/8/2022 11:15 AM CARLOTA [facility]   9/13/2022 11:15 AM ORLANDO Madrigal [facility]   9/15/2022 11:15 AM ORLANDO Madrigal [facility]

## 2022-09-06 ENCOUNTER — HOSPITAL ENCOUNTER (OUTPATIENT)
Dept: PHYSICAL THERAPY | Age: 62
Setting detail: RECURRING SERIES
Discharge: HOME | End: 2022-09-09
Payer: COMMERCIAL

## 2022-09-06 PROCEDURE — 97110 THERAPEUTIC EXERCISES: CPT

## 2022-09-06 ASSESSMENT — PAIN SCALES - GENERAL: PAINLEVEL_OUTOF10: 0

## 2022-09-06 NOTE — PROGRESS NOTES
Frank [place]  : 1960  Primary: Medicare Part A And B  Secondary: [place] @ [facility] [address]  Phone: [phone]  Fax: [phone] Plan Frequency: 2/week x 12 weeks    Plan of Care/Certification Expiration Date: 22      PT Visit Info: Total # of Visits to Date: 24      OUTPATIENT PHYSICAL THERAPY:OP NOTE TYPE: Treatment Note 2022       Episode  }Appt Desk              Treatment Diagnosis: Difficulty in walking, Not elsewhere classified (R26.2)  Low Back Pain (M54.5) Pain in right hip (M25. 551)  Medical/Referring Diagnosis:  Spondylosis without myelopathy or radiculopathy, lumbosacral region [M47.817]  Chronic pain syndrome [G89.4]  Unilateral primary osteoarthritis, unspecified hip [M16.10]  Referring Physician:  Shiv Scott MD Orders:  PT Eval and Treat   Date of Onset:  Onset Date: 09/15/15     Allergies:   Patient has no allergy information on record. Restrictions/Precautions:  Restrictions/Precautions: None  No data recorded   Interventions Planned (Treatment may consist of any combination of the following):    Current Treatment Recommendations: Strengthening; ROM; Manual Therapy - Soft Tissue Mobilization; Manual Therapy - Joint Manipulation; Pain management; Home exercise program; Aquatics     Subjective Comments:    Pt reports no pain prior to treatment. She had respiratory therapy for an hour, prior to PT. Initial:}    0/10Post Session:       /10, pt reports mild soreness  Medications Last Reviewed:  2022  Updated Objective Findings:   progress note 22  Treatment   THERAPEUTIC EXERCISE: (45 minutes):    Exercises per grid below to improve mobility, strength and balance. Required MOD visual and verbal cues to promote proper body mechanics.   Progressed repetitions and complexity of movement as indicated.     -side lying B abduction 2x10 with tactile cues  -supine bridges 3x10   -pelvic tilts x15  -side lying Abraham stretch 1 min hold x 4 B off the edge of the table (practiced deep breathing during stretching)  -supine figure 4 stretch 30 sec hold x 2 B  -single knee to chest stretch 10 sec hold x 6 B alternating    Stairs- 14 steps with one hand on the rail. Pt ascended the stairs with a reciprocal, step over step pattern. Pt descended with a step to step pattern and externally rotated the R hip. Forward step ups 8 inch x15 B with one hand on the rail. Step downs x15 B with 4 inch box and one hand on rail. Lateral step ups x15 B on 4 inch box and UE support. Treatment/Session Summary:    Treatment Assessment:   Pt continues to have pain standing long periods of time but she also had respiratory therapy today too. She has no difficulty ascending the stairs but descending the steps is difficult on the R side. She has improved strength with bridges. Communication/Consultation:  HEP reviewed with the pt- added the stretches  Equipment provided today: none  Recommendations/Intent for next treatment session: Next visit will focus on strengthening the hip and LE's. Continue to progress functional strengthening.     Total Treatment Billable Duration:  45 minutes   Time In: 8941  Time Out: 1200    Ashley Perez PTA       Charge Capture  }Post Session Pain  PT Visit Info  [employer] Portal  MD Guidelines  Scanned Media  Benefits  MyChart    Future Appointments   Date Time Provider Heber Lockett   9/8/2022 11:15 AM Taylor Soto, PT [facility]   9/13/2022  9:45 AM Ashley Perez PTA [facility]   9/15/2022  9:30 AM Ashley Perez, PTA [facility]   9/22/2022  9:00 AM Taylor Rhae Charles, PT [facility]   9/29/2022  9:00 AM Taylor Rhae Charles, PT [facility]   10/11/2022  9:30 AM Ashley Perez, PTA [facility]   10/13/2022  9:30 AM Ashley Perez, PTA [facility]   10/18/2022  9:30 AM Ashley Perez, PTA [facility]   10/20/2022  9:30 AM Patti MARQUEZ Jess Marroquin, [facility]   10/25/2022  9:30 AM Yaima Slaughter PTA [facility]   10/27/2022  9:00 AM Taylor [place], PT [facility]

## 2022-09-08 ENCOUNTER — HOSPITAL ENCOUNTER (OUTPATIENT)
Dept: PHYSICAL THERAPY | Age: 62
Setting detail: RECURRING SERIES
Discharge: HOME | End: 2022-09-11
Payer: COMMERCIAL

## 2022-09-08 PROCEDURE — 97110 THERAPEUTIC EXERCISES: CPT

## 2022-09-08 NOTE — PROGRESS NOTES
Lili Jackson  : 1960  Primary: Medicare Part A And B  Secondary: [place] @ [address] [address]  Phone: [phone]  Fax: [phone] Plan Frequency: 2/week x 12 weeks    Plan of Care/Certification Expiration Date: 22      PT Visit Info: Total # of Visits to Date: 70 Ricardo Buchanan report OP NOTE TYPE:  2022       Episode  }Appt Desk              Treatment Diagnosis: Difficulty in walking, Not elsewhere classified (R26.2)  Low Back Pain (M54.5) Pain in right hip (M25. 551)  Medical/Referring Diagnosis:  Spondylosis without myelopathy or radiculopathy, lumbosacral region [M47.817]  Chronic pain syndrome [G89.4]  Unilateral primary osteoarthritis, unspecified hip [M16.10]  Referring Physician:  Sonu Carmona MD Orders:  PT Eval and Treat   Date of Onset:  Onset Date: 09/15/15     Allergies:   Patient has no allergy information on record. Restrictions/Precautions:  Restrictions/Precautions: None  No data recorded   Interventions Planned (Treatment may consist of any combination of the following):    Current Treatment Recommendations: Strengthening; ROM; Manual Therapy - Soft Tissue Mobilization; Manual Therapy - Joint Manipulation; Pain management; Home exercise program; Aquatics     Subjective Comments I am doing pulmonary rehab and it is difficult because at times I have more pain after pulmonary rehab. Pt had pulmonary rehab today and states that she is not hurting too badly today. Initial:}  1   /10Post Session:     2   /10  Medications Last Reviewed:  2022  Updated Objective Findings:  Pt has attended 22 visits of PT and continues to make functional improvements and meet goals. She is to continue with current plan of care.   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 MET 9/8/22  Timed up and go 9.5 seconds  Improve Oswestry score by 10 or greater indicating improved function. Ongoing improved by 5 points 9/8/22  Improve hip flexibility to accommodate longer stepping  Ongoing 9/8/22  Up/down  6 stairs with hand railing with greater ease with step over step gait.  MET 8/11/22 Continues to use step to gait descending steps  [id number]}  Time In: 1110  Time Out: 1150    Taylor Burk, PT       Charge Capture  }Post Session Pain  PT Visit Info  [employer] Portal  MD Guidelines  Scanned Media  Benefits  MyChart    Future Appointments   Date Time Provider Heber Lockett   9/13/2022  9:45 AM Odalis Montero PTA [facility]   9/15/2022  9:30 AM Odalis Montero, PTA [facility]   9/22/2022  9:00 AM Taylor [place], PT [facility]   9/29/2022  9:00 AM Taylorrafael Cavanaugh [place], PT [facility]   10/11/2022  9:30 AM Odalis Montero PTA [facility]   10/13/2022  9:30 AM Odalis Montero PTA [facility]   10/18/2022  9:30 AM Odalis Montero PTA [facility]   10/20/2022  9:30 AM Odalis Montero, PTA [facility]   10/25/2022  9:30 AM Odalis Montero, PTA [facility]   10/27/2022  9:00 AM Taylor [place], PT [facility]

## 2022-09-08 NOTE — PROGRESS NOTES
Frank [place]  : 1960  Primary: Medicare Part A And B  Secondary: [place] @ [address] [place] [address] Way [address]  Phone: [phone]  Fax: [phone] Plan Frequency: 2/week x 12 weeks    Plan of Care/Certification Expiration Date: 22      PT Visit Info: Total # of Visits to Date: 25      OUTPATIENT PHYSICAL THERAPY:OP NOTE TYPE: Treatment Note 2022       Episode  }Appt Desk              Treatment Diagnosis: Difficulty in walking, Not elsewhere classified (R26.2)  Low Back Pain (M54.5) Pain in right hip (M25. 551)  Medical/Referring Diagnosis:  Spondylosis without myelopathy or radiculopathy, lumbosacral region [M47.817]  Chronic pain syndrome [G89.4]  Unilateral primary osteoarthritis, unspecified hip [M16.10]  Referring Physician:  hSiv Scott MD Orders:  PT Eval and Treat   Date of Onset:  Onset Date: 09/15/15     Allergies:   Patient has no allergy information on record. Restrictions/Precautions:  Restrictions/Precautions: None  No data recorded   Interventions Planned (Treatment may consist of any combination of the following):    Current Treatment Recommendations: Strengthening; ROM; Manual Therapy - Soft Tissue Mobilization; Manual Therapy - Joint Manipulation; Pain management; Home exercise program; Aquatics     Subjective Comments:  I am stronger and I have less pain on most days. It is hard if I have pulmonary rehab and PT on same day. I am doing well today though. Initial:}    1 /10Post Session:      1 /10, pt reports mild soreness  Medications Last Reviewed:  2022  Updated Objective Findings:   progress note today  Treatment   THERAPEUTIC EXERCISE: (40 minutes):    Exercises per grid below to improve mobility, strength and balance. Required MOD visual and verbal cues to promote proper body mechanics.   Progressed repetitions and complexity of movement as indicated.     -side lying B abduction 2x10 with tactile cues  -supine bridges 3x10   -pelvic tilts x15  -side lying Abraham stretch 1 min hold x 4 B off the edge of the table (practiced deep breathing during stretching)  -supine figure 4 stretch 30 sec hold x 2 B  -Step downs x15 B with 4 inch box and one hand on rail. Reassessment for progress note      Treatment/Session Summary:    Treatment Assessment: continues to work hard and make functional gains slowly. On track to meet goals with continuance     Communication/Consultation:    Equipment provided today: none  Recommendations/Intent for next treatment session: Next visit will focus on strengthening the hip and LE's. Continue to progress functional strengthening.     Total Treatment Billable Duration:  40 minutes   Time In: 1110  Time Out: 4050 Harvinder Barger, PT       Charge Capture  }Post Session Pain  PT Visit Info  [employer] Portal  MD Guidelines  Scanned Media  Benefits  MyChart    Future Appointments   Date Time Provider Heber Lockett   9/13/2022  9:45 AM Damon Ropes, PTA [facility]   9/15/2022  9:30 AM Damon Ropes, PTA [facility]   9/22/2022  9:00 AM Taylor Michaelle Cornea, PT [facility]   9/29/2022  9:00 AM Taylor Michaelle Cornea, PT [facility]   10/11/2022  9:30 AM Damon Ropes, PTA [facility]   10/13/2022  9:30 AM Damon Ropes, PTA [facility]   10/18/2022  9:30 AM Damon Ropes, PTA [facility]   10/20/2022  9:30 AM Damon Ropes, PTA [facility]   10/25/2022  9:30 AM Damon Ropes, PTA [facility]   10/27/2022  9:00 AM Taylor Michaelle Cornea, PT [facility]

## 2022-09-13 ENCOUNTER — HOSPITAL ENCOUNTER (OUTPATIENT)
Dept: PHYSICAL THERAPY | Age: 62
Setting detail: RECURRING SERIES
Discharge: HOME | End: 2022-09-16
Payer: COMMERCIAL

## 2022-09-13 PROCEDURE — 97110 THERAPEUTIC EXERCISES: CPT

## 2022-09-13 NOTE — PROGRESS NOTES
support  -side lying B abduction 2x10 with tactile cues  -supine bridges 3x10   -pelvic tilts x20 with 5 sec hold  -side lying Abraham stretch 1 min hold x 4 B off the edge of the table  -supine piriformis stretch 30 sec hold x 4 B  -prone hamstring curls 2x10 B      Treatment/Session Summary:    Treatment Assessment:   No reports of increased pain but pt's hip flexors were tight and limited. Continue to progress standing exercises to increased indepence with ADLs. Communication/Consultation:    Equipment provided today: none  Recommendations/Intent for next treatment session: Next visit will focus on strengthening the hip and LE's. Continue to progress functional strengthening.     Total Treatment Billable Duration:  45 minutes   Time In: 0565  Time Out: 1030    Kayden Jenny, PTA       Charge Capture  }Post Session Pain  PT Visit Info  [employer] Portal  MD Guidelines  Scanned Media  Benefits  MyChart    Future Appointments   Date Time Provider Heber Lockett   9/15/2022  9:30 AM Kayden Fontan, PTA [facility]   9/22/2022  9:00 AM Taylor Celester Paula, PT [facility]   9/29/2022  9:00 AM Taylor Celester Paula, PT [facility]   10/11/2022  9:30 AM Kayden Fontan, PTA [facility]   10/13/2022  9:30 AM Kayden Fontan, PTA [facility]   10/18/2022  9:30 AM Kayden Fontan, PTA [facility]   10/20/2022  9:30 AM Kayden Fontan, PTA [facility]   10/25/2022  9:30 AM Kayden Fontan, PTA [facility]   10/27/2022  9:00 AM Taylor Celester Paula, PT [facility]

## 2022-09-15 ENCOUNTER — HOSPITAL ENCOUNTER (OUTPATIENT)
Dept: PHYSICAL THERAPY | Age: 62
Setting detail: RECURRING SERIES
Discharge: HOME | End: 2022-09-18
Payer: COMMERCIAL

## 2022-09-15 PROCEDURE — 97110 THERAPEUTIC EXERCISES: CPT

## 2022-09-15 ASSESSMENT — PAIN SCALES - GENERAL: PAINLEVEL_OUTOF10: 2

## 2022-09-15 NOTE — PROGRESS NOTES
Mohinder Bolaños  : 1960  Primary: Medicare Part A And B  Secondary: [place] @ [facility] [address] [address]  Phone: [phone]  Fax: [phone] Plan Frequency: 2/week x 12 weeks    Plan of Care/Certification Expiration Date: 22      PT Visit Info: Total # of Visits to Date: 25      OUTPATIENT PHYSICAL THERAPY:OP NOTE TYPE: Treatment Note 9/15/2022       Episode  }Appt Desk              Treatment Diagnosis: Difficulty in walking, Not elsewhere classified (R26.2)  Low Back Pain (M54.5) Pain in right hip (M25. 551)  Medical/Referring Diagnosis:  Spondylosis without myelopathy or radiculopathy, lumbosacral region [M47.817]  Chronic pain syndrome [G89.4]  Unilateral primary osteoarthritis, unspecified hip [M16.10]  Referring Physician:  Ivonne Ball MD Orders:  PT Eval and Treat   Date of Onset:  Onset Date: 09/15/15     Allergies:   Patient has no allergy information on record. Restrictions/Precautions:  Restrictions/Precautions: None  No data recorded   Interventions Planned (Treatment may consist of any combination of the following):    Current Treatment Recommendations: Strengthening; ROM; Manual Therapy - Soft Tissue Mobilization; Manual Therapy - Joint Manipulation; Pain management; Home exercise program; Aquatics     Subjective Comments:   Pt reports pain on the R side. Initial:}    2/10Post Session:       no increase  Medications Last Reviewed:  9/15/2022  Updated Objective Findings:  see progress note 22  Treatment   THERAPEUTIC EXERCISE: (45 minutes):    Exercises per grid below to improve mobility, strength and balance. Required MOD visual and verbal cues to promote proper body mechanics. Progressed repetitions and complexity of movement as indicated. Stretching prior: standing hamstring stretch with LE on mat table.  Rotation to each side to target hip adductors and lateral hip  -sit to stand from mat table 3x10 without UE support  -supine bridges 3x10 with stability ball  -side lying Abraham stretch 1 min hold x 4 B off the edge of the table  -supine figure 4 stretch 30 sec hold x 4 B  -lower trunk rotation with stability ball x30 B  -step ups onto 6 inch box no UE support x15 each LE      Treatment/Session Summary:    Treatment Assessment:   Pt's B hamsting length is tight and limited, affecting her lumbar ROM and pain. She reported mild cramping in the gastroc/soleus with stretching. She requires frequent verbal cues for correct body mechanics during sit to stands and step ups. She demonstrates B quadriceps weakness and adducts the hips for support when transferring from sitting to standing. Communication/Consultation:    Equipment provided today: none  Recommendations/Intent for next treatment session: Next visit will focus on strengthening the hip and LE's. Continue to progress functional strengthening.     Total Treatment Billable Duration:  45 minutes   Time In: 0930  Time Out: [address], PTA       Charge Capture  }Post Session Pain  PT Visit Info  [employer] Portal  MD Guidelines  Scanned Media  Benefits  MyChart    Future Appointments   Date Time Provider Heber Lockett   9/22/2022  9:00 AM Taylor [facility], PT [facility]   9/29/2022  9:00 AM Deirdre [facility], PT [facility]   10/11/2022  9:30 AM Edgardo Crutch, PTA [facility]   10/13/2022  9:30 AM Edgardo Crutch, PTA [facility]   10/18/2022  9:30 AM Edgardo Crutch, PTA [facility] [address] [facility]   10/20/2022  9:30 AM Edgardo Crutch, PTA [facility]   10/25/2022  9:30 AM Edgardo Crutch, PTA [facility]   10/27/2022  9:00 AM Taylor [facility], PT [facility]

## 2022-09-22 ENCOUNTER — HOSPITAL ENCOUNTER (OUTPATIENT)
Dept: PHYSICAL THERAPY | Age: 62
Setting detail: RECURRING SERIES
Discharge: HOME | End: 2022-09-25
Payer: COMMERCIAL

## 2022-09-22 PROCEDURE — 97110 THERAPEUTIC EXERCISES: CPT

## 2022-09-22 NOTE — PROGRESS NOTES
Liban Tafoya  : 1960  Primary: Medicare Part A And B  Secondary: [place] [facility] @ [address] [place] [address] Way [address]  Phone: [phone]  Fax: [phone] Plan Frequency: 2/week x 12 weeks    Plan of Care/Certification Expiration Date: 22      PT Visit Info: Total # of Visits to Date: 22      OUTPATIENT PHYSICAL THERAPY:OP NOTE TYPE: Treatment Note 2022       Episode  }Appt Desk              Treatment Diagnosis: Difficulty in walking, Not elsewhere classified (R26.2)  Low Back Pain (M54.5) Pain in right hip (M25. 551)  Medical/Referring Diagnosis:  Spondylosis without myelopathy or radiculopathy, lumbosacral region [M47.817]  Chronic pain syndrome [G89.4]  Unilateral primary osteoarthritis, unspecified hip [M16.10]  Referring Physician:  Joan Hernandez MD Orders:  PT Eval and Treat   Date of Onset:  Onset Date: 09/15/15     Allergies:   Patient has no allergy information on record. Restrictions/Precautions:  Restrictions/Precautions: None  No data recorded   Interventions Planned (Treatment may consist of any combination of the following):    Current Treatment Recommendations: Strengthening; ROM; Manual Therapy - Soft Tissue Mobilization; Manual Therapy - Joint Manipulation; Pain management; Home exercise program; Aquatics     Subjective Comments: Pt stated the stretching last time helped her back pain. She tried to ride her bicycle and was very tight in her quads. Pt was 15 minutes late due to traffic     Initial:}  4   /10Post Session:       no increase  Medications Last Reviewed:  2022  Updated Objective Findings:  see progress note 22  Treatment   THERAPEUTIC EXERCISE: (30 minutes):    Exercises per grid below to improve mobility, strength and balance. Required MOD visual and verbal cues to promote proper body mechanics. Progressed repetitions and complexity of movement as indicated.      Stretching prior: standing hamstring stretch with LE on mat table. Rotation to each side to target hip adductors and lateral hip    Standing hamstring curls for quad stretch required assist for positioning 3 x 20 sec  Sit on edge ofchair and dropp leg off for hip flex and quad stretch 3 x 20 sec  -supine figure 4 stretch 30 sec hold x 2 B  -lower trunk rotation x10 B  -lying prone for hip flex stretch add hs curl for quad stretch- had frequent cramping in HS      Treatment/Session Summary:    Treatment Assessment: Tightness in hips and LE. Continues to push herself functionallyt     Communication/Consultation:  none  Equipment provided today: none none  Recommendations/Intent for next treatment session: Next visit will focus on strengthening the hip and LE's. Continue to progress functional strengthening.     Total Treatment Billable Duration:  30 minutes   Time In: 9514  Time Out: 0945    Paz Farah, PT       Charge Capture  }Post Session Pain  [facility] [address] Portal  MD Guidelines  Scanned Media  Benefits  MyChart    Future Appointments   Date Time Provider Heber Lockett   9/29/2022  9:00 AM Paz Farah, PT [facility]   10/11/2022  9:30 AM Marvin Pabon PTA [facility]   10/13/2022  9:[facility]   10/18/2022  9:30 [facility]   10/20/2022  9:30 AM ORLANDO [place] [facility]   10/25/2022  9:30 AM [place]LANDO [place] [facility]   10/27/2022  9:00 AM CARLOTA Hardin [facility]

## 2022-09-29 ENCOUNTER — HOSPITAL ENCOUNTER (OUTPATIENT)
Dept: PHYSICAL THERAPY | Age: 62
Setting detail: RECURRING SERIES
End: 2022-09-29
Payer: COMMERCIAL

## 2022-09-29 NOTE — PROGRESS NOTES
Collin Jhaveri  : 1960  Primary: Medicare Part A And B  Secondary: [facility] @ Heber Siegel  [address] [place] [address] Way [address]  Phone: [phone]  Fax: [phone]    PT Visit Info: Total # of Visits to Date: 22     OT Visit Info:  No data recorded    OUTPATIENT THERAPY:OP NOTE TYPE: Progress Report 2022               Episode  Appt Desk           Collin Jhaveri did not show for her appointment for today due to unknown reasons. Will plan to follow up next during next appointment.   Thank you,  Michelle Kelsey, PT    Future Appointments   Date Time Provider Heber Lockett   10/11/2022  9:30 AM Sydnie Stark PTA [facility]   10/13/2022  9:30 AM [facility]   10/18/2022  9:30 AM [facility]   10/20/2022  9:30 AM [facility]   10/25/2022  9:30 AM ORLANDO [place] [facility]   10/27/2022  9:00 AM Nain Banks, CARLOTA GRIFFIN [facility]

## 2022-10-11 ENCOUNTER — HOSPITAL ENCOUNTER (OUTPATIENT)
Dept: PHYSICAL THERAPY | Age: 62
Setting detail: RECURRING SERIES
End: 2022-10-11
Payer: COMMERCIAL

## 2022-10-13 ENCOUNTER — HOSPITAL ENCOUNTER (OUTPATIENT)
Dept: PHYSICAL THERAPY | Age: 62
Setting detail: RECURRING SERIES
Discharge: HOME | End: 2022-10-16
Payer: COMMERCIAL

## 2022-10-13 PROCEDURE — 97110 THERAPEUTIC EXERCISES: CPT

## 2022-10-13 NOTE — PROGRESS NOTES
Jj Willis  : 1960  Primary: Medicare Part A And B  Secondary: [place] @ [address] [place] [address] Way [address]  Phone: [phone]  Fax: [phone] Plan Frequency: 2/week x 12 weeks    Plan of Care/Certification Expiration Date: 22      PT Visit Info: Total # of Visits to Date: 32      OUTPATIENT PHYSICAL THERAPY:OP NOTE TYPE: Treatment Note 10/13/2022       Episode  }Appt Desk              Treatment Diagnosis: Difficulty in walking, Not elsewhere classified (R26.2)  Low Back Pain (M54.5) Pain in right hip (M25. 551)  Medical/Referring Diagnosis:  Spondylosis without myelopathy or radiculopathy, lumbosacral region [M47.817]  Chronic pain syndrome [G89.4]  Unilateral primary osteoarthritis, unspecified hip [M16.10]  Referring Physician:  Rani Campbell MD Orders:  PT Eval and Treat   Date of Onset:  Onset Date: 09/15/15     Allergies:   Patient has no allergy information on record. Restrictions/Precautions:  Restrictions/Precautions: None  No data recorded   Interventions Planned (Treatment may consist of any combination of the following):    Current Treatment Recommendations: Strengthening; ROM; Manual Therapy - Soft Tissue Mobilization; Manual Therapy - Joint Manipulation; Pain management; Home exercise program; Aquatics     Subjective Comments:   Pt reports she feels like she is getting stronger. She had to walk 0.8 of a mile last week for her [employer] conference and she was able to do it. Initial:     /10Post Session:       no increase  Medications Last Reviewed:  10/13/2022  Updated Objective Findings:  see progress note 22  Treatment   THERAPEUTIC EXERCISE: (40 minutes):    Exercises per grid below to improve mobility, strength and balance. Required MOD visual and verbal cues to promote proper body mechanics. Progressed repetitions and complexity of movement as indicated.      Stretching prior: standing hamstring stretch with LE on mat table. Rotation to each side to target hip adductors and lateral hip  -sit to stand from mat table x10 without UE support   mat table 25.5 in high and sit to stand with just tapping bottom 2x10  -supine bridges 3x10 with stability ball  -side lying Abraham stretch 1 min hold x 4 B off the edge of the table  - prone quad stretch with verbal cues to relax 10-20\"x4 ea  -lower trunk rotation with stability ball x30 B  -1 flight of stairs  step over step  - piriformis stretch with leg on table in figure 4   - step ups on stair step 10x B  - sideways step up on stair step 10x B      Treatment/Session Summary:    Treatment Assessment:   She was able to negotiate 1 flight of stairs with step over step ascending and descending but had decreased quad control descending on B LEs. Communication/Consultation:    Equipment provided today: none  Recommendations/Intent for next treatment session: Next visit will focus on strengthening the hip and LE's. Continue to progress functional strengthening.     Total Treatment Billable Duration:  40 minutes   Time In: 1300  Time Out: 6254    Kaitlyn Amaya, PT       Charge Capture  }Post Session Pain  PT Visit Info  [employer] Portal  MD Guidelines  Scanned Media  Benefits  MyChart    Future Appointments   Date Time Provider Heber Lockett   10/18/2022  9:30 AM Ava Macias PTA [facility]   10/20/2022  9:30 AM Ava Macias PTA [facility]   10/25/2022  9:30 AM ORLANDO Reyes   10/27/2022  9:00 AM Kaitlyn Amaya PT [facility]

## 2022-10-13 NOTE — PROGRESS NOTES
Shanna Tran  : 1960  Primary: Medicare Part A And B  Secondary: [place] @ [facility] [address] [address]  Phone: [phone]  Fax: [phone] Plan Frequency: 2/week x 12 weeks    Plan of Care/Certification Expiration Date: 22      PT Visit Info: Total # of Visits to Date: 32      OUTPATIENT PHYSICAL THERAPY:  Progress Report 10/13/2022               Episode  Appt Desk         Treatment Diagnosis:  Difficulty in walking, Not elsewhere classified (R26.2)  Low Back Pain (M54.5) Pain in right hip (M25. 551)  Medical/Referring Diagnosis:  Spondylosis without myelopathy or radiculopathy, lumbosacral region [M47.817]  Chronic pain syndrome [G89.4]  Unilateral primary osteoarthritis, unspecified hip [M16.10]  Referring Physician:  Mary Del Cid MD Orders:  PT Eval and Treat Aquatics  Return MD Appt:    Date of Onset:  Onset Date: 09/15/15     Allergies:  Patient has no allergy information on record. Restrictions/Precautions:    Restrictions/Precautions: None  No data recorded   Medications Last Reviewed:  10/13/2022        OBJECTIVE   Walking:  ambulates with decreased step length bilateral, slight trunk flexion  Stairs:  pt ambulates with step over step ascending and descending with hand rail with wide base of support descending, difficulty pushing off right LE ascending. Standin min if have something to prop on,  15 min with no support  Pain: 2-3/10. Pain at worst 4/5    ASSESSMENT   Assessment: Shanna Tran presents to physical therapy with complaints of low back and R hip pain. She is progressing with LE strength and endurance and reports of decreased pain. She reports she is able to stand for up to 30 minutes with support and 15 min with no support.  Negotiating stairs has improved and she is now able to use reciprocal pattern for ascending and descending but needs to work on LE control with both. She continues with decreased flexibility and tightness in hip extension bilateral. She will benefit from continuing skilled physical therapy to continue to progress functional mobility. Problem List: (Impacting functional limitations): Body Structures, Functions, Activity Limitations Requiring Skilled Therapeutic Intervention: Decreased functional mobility ; Decreased ROM; Decreased strength; Increased pain; Decreased posture        PLAN   Effective Dates: 6/15/22 TO Plan of Care/Certification Expiration Date: 11/09/22     Frequency/Duration: Plan Frequency: 2/week x 12 weeks     Interventions Planned (Treatment may consist of any combination of the following):    Current Treatment Recommendations: Strengthening; ROM; Manual Therapy - Soft Tissue Mobilization; Manual Therapy - Joint Manipulation; Pain management; Home exercise program; Aquatics     Goals: (Goals have been discussed and agreed upon with patient.)  Short-Term Functional Goals: Time Frame: 3-4 weeks  Pt to perform 45 minutes of aquatic therapy 2/week consistently  MET  Pt to report a decrease in pain either intensity or frequency  MET    Discharge Goals: Time Frame: 9-12 weeks  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 MET 9-8-22  Improve Oswestry score by 10 or greater indicating improved function. progressing   Improve hip flexibility to accommodate longer stepping  progressing 10-13-22  Up/down  6 stairs with hand railing with greater ease with step over step gait. Progressing towards 10-13-22         Outcome Measure: Tool Used: Modified Oswestry Low Back Pain   Score:  Initial: 28/50  Most Recent: 23/50 (Date: 9-8-22)   Interpretation of Score: Each section is scored on a 0-5 scale, 5 representing the greatest disability. The scores of each section are added together for a total score of 50.       Tool Used: Timed Up and Go (TUG)  Score:  Initial: 12.5 seconds Most Recent: 9.5 seconds (Date: 9-8-22 )   Interpretation of Score: The test measures, in seconds, the time taken by an individual to stand up from a standard arm chair (seat height 46 cm [18 in], arm height 65 cm [25.6 in]), walk a distance of 3 meters (118 in, approx 10 ft), turn, walk back to the chair and sit down. If the individual takes longer than 14 seconds to complete TUG, this indicates risk for falls. Medical Necessity:   Patient is expected to demonstrate progress in strength, range of motion, balance and coordination to improve safety during ambulation and stair use. Also improve endurance for cooking and d=adls. .  Reason For Services/Other Comments:  She continues to require moderate verbal cueing for body positioning with exercises         Post Session Pain  Charge Capture  PT Visit Info  POC Link  Treatment Note Link  MD Guidelines  Alexi

## 2022-10-18 ENCOUNTER — HOSPITAL ENCOUNTER (OUTPATIENT)
Dept: PHYSICAL THERAPY | Age: 62
Setting detail: RECURRING SERIES
Discharge: HOME | End: 2022-10-21
Payer: COMMERCIAL

## 2022-10-18 PROCEDURE — 97110 THERAPEUTIC EXERCISES: CPT

## 2022-10-18 NOTE — PROGRESS NOTES
Tala Jimenezkriss  : 1960  Primary: Medicare Part A And B  Secondary: [place] @ [facility]0 [address] [address]  Phone: [phone]  Fax: [phone] Plan Frequency: 2/week x 12 weeks    Plan of Care/Certification Expiration Date: 22      PT Visit Info: Total # of Visits to Date: 32      OUTPATIENT PHYSICAL THERAPY:OP NOTE TYPE: Treatment Note 10/18/2022       Episode  }Appt Desk              Treatment Diagnosis: Difficulty in walking, Not elsewhere classified (R26.2)  Low Back Pain (M54.5) Pain in right hip (M25. 551)  Medical/Referring Diagnosis:  Spondylosis without myelopathy or radiculopathy, lumbosacral region [M47.817]  Chronic pain syndrome [G89.4]  Unilateral primary osteoarthritis, unspecified hip [M16.10]  Referring Physician:  Noe Cedeno MD Orders:  PT Eval and Treat   Date of Onset:  Onset Date: 09/15/15     Allergies:   Patient has no allergy information on record. Restrictions/Precautions:  Restrictions/Precautions: None  No data recorded   Interventions Planned (Treatment may consist of any combination of the following):    Current Treatment Recommendations: Strengthening; ROM; Manual Therapy - Soft Tissue Mobilization; Manual Therapy - Joint Manipulation; Pain management; Home exercise program; Aquatics     Subjective Comments: Pt reports no pain today and states that she is able to walk longer distances. Initial:}     0/10Post Session:       0/10  Medications Last Reviewed:  10/18/2022  Updated Objective Findings:  see progress note 10/13/22  Treatment   THERAPEUTIC EXERCISE: (40 minutes):    Exercises per grid below to improve mobility, strength and balance. Required MOD visual and verbal cues to promote proper body mechanics. Progressed repetitions and complexity of movement as indicated.      Stretching:  -prone laying x2 min  -side lying Abraham stretch 1 min hold x 4 B off the edge of the table  - prone quad stretch with verbal cues to relax 1 min hold x4 each  -supine figure four stretch 30 sec hold x 4 B  -supine hamstring stretch 1 min hold x 4 B  -standing calf stretch 30 sec hold x 2 B    Gait training  Even surfaces 50 ft with cues to increase B heel strike and gait speed. Treatment/Session Summary:    Treatment Assessment: Pt reports less pain overall and the HEP with the stretches was re-issued. Pt was advised to stretch everyday. Next treatment will focus on strengthening. Communication/Consultation:    Equipment provided today: none  Recommendations/Intent for next treatment session: Next visit will focus on strengthening the hip and LE's. Continue to progress functional strengthening.     Total Treatment Billable Duration:  40 minutes   Time In: 0930  Time Out: 1015    Zenia Goldmann, PTA       Charge Capture  }Post Session Pain  PT Visit Info  [employer] Portal  MD Guidelines  Scanned Media  Benefits  MyChart    Future Appointments   Date Time Provider Heber Locektt   10/20/2022  9:30 AM Zenia Goldmann, PTA [facility]   10/25/2022  9:30 AM Zenia Goldmann, PTA [facility]   10/27/2022  9:00 AM CARLOTA KennedyO

## 2022-10-20 ENCOUNTER — HOSPITAL ENCOUNTER (OUTPATIENT)
Dept: PHYSICAL THERAPY | Age: 62
Setting detail: RECURRING SERIES
Discharge: HOME | End: 2022-10-23
Payer: COMMERCIAL

## 2022-10-20 PROCEDURE — 97110 THERAPEUTIC EXERCISES: CPT

## 2022-10-20 NOTE — PROGRESS NOTES
Aleah Gregory  : 1960  Primary: Medicare Part A And B  Secondary: [place] @ [address] [address]  Phone: [phone]  Fax: [phone] Plan Frequency: 2/week x 12 weeks    Plan of Care/Certification Expiration Date: 22      PT Visit Info: Total # of Visits to Date: 29      OUTPATIENT PHYSICAL THERAPY:OP NOTE TYPE: Treatment Note 10/20/2022       Episode  }Appt Desk              Treatment Diagnosis: Difficulty in walking, Not elsewhere classified (R26.2)  Low Back Pain (M54.5) Pain in right hip (M25. 551)  Medical/Referring Diagnosis:  Spondylosis without myelopathy or radiculopathy, lumbosacral region [M47.817]  Chronic pain syndrome [G89.4]  Unilateral primary osteoarthritis, unspecified hip [M16.10]  Referring Physician:  Lavon Hoover MD Orders:  PT Eval and Treat   Date of Onset:  Onset Date: 09/15/15     Allergies:   Patient has no allergy information on record. Restrictions/Precautions:  Restrictions/Precautions: None  No data recorded   Interventions Planned (Treatment may consist of any combination of the following):    Current Treatment Recommendations: Strengthening; ROM; Manual Therapy - Soft Tissue Mobilization; Manual Therapy - Joint Manipulation; Pain management; Home exercise program; Aquatics     Subjective Comments: Pt reports no pain today but states that she is not getting enough sleep. 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 Initial:}     0/10Post Session:       0/10  Medications Last Reviewed:  10/20/2022  Updated Objective Findings:  see progress note 10/13/22  Treatment   THERAPEUTIC EXERCISE: (40 minutes):    Exercises per grid below to improve mobility, strength and balance. Required MOD visual and verbal cues to promote proper body mechanics. Progressed repetitions and complexity of movement as indicated.   Discussed strategies to relieve stress, such as going to bed at a regular time and trying to get 8 hours of sleep. Pt verbalized understanding but states that her mother has a sleep disorder and keeps her up at night. Stretching:    - prone quad stretch with verbal cues to relax 1 min hold x4 each  -standing hip flexor stretch in the doorway 30 sec hold x 4 each  -standing calf stretch 30 sec hold x 2 B    Strengthening  Sit to stand from low mat table 3x10, no UE support  Step ups x15 B LE onto 8 inch            Treatment/Session Summary:    Treatment Assessment: Pt did not report increased pain with activity. She is making improvement with strengthening and stretching but the pain is intermittent. Communication/Consultation:    Equipment provided today: none  Recommendations/Intent for next treatment session: Next visit will focus on strengthening the hip and LE's. Continue to progress functional strengthening.     Total Treatment Billable Duration:  40 minutes   Time In: 0930  Time Out: 1015    Reagan Aaron PTA       Charge Capture  }Post Session Pain  PT Visit Info  [employer] Portal  MD Guidelines  Scanned Media  Benefits  MyChart    Future Appointments   Date Time Provider Heber Lockett   10/25/2022  9:30 AM Reagan Aaron PTA [facility]   10/27/2022  9:30 AM Reagan Aaron PTA [facility]   11/1/2022  9:30 AM Reagan Aaron PTA [facility]   11/3/2022  9:30 AM Reagan Aaron PTA [facility]   11/8/2022  9:30 AM Breanna Kelly, PT [facility]   11/10/2022  9:30 AM Reagan Aaron PTA [facility]   11/15/2022  9:30 AM Breanna Kelly, PT [facility]   11/17/2022  9:30 AM Reagan Aaron PTA [facility]   11/29/2022  9:30 AM Reagan Aaron PTA [facility]   12/1/2022  9:30 AM Reagan Aaron PTA [facility]   12/6/2022  9:30 AM Reagan Aaron PTA [facility]   12/8/2022  9:30 AM Reagan Aaron PTA [facility]   12/13/2022  9:30 AM Leobardo Downs, PTA [facility]

## 2022-10-25 ENCOUNTER — HOSPITAL ENCOUNTER (OUTPATIENT)
Dept: PHYSICAL THERAPY | Age: 62
Setting detail: RECURRING SERIES
Discharge: HOME | End: 2022-10-28
Payer: COMMERCIAL

## 2022-10-25 PROCEDURE — 97110 THERAPEUTIC EXERCISES: CPT

## 2022-10-25 NOTE — PROGRESS NOTES
Tala Jimenezkriss  : 1960  Primary: Medicare Part A And B  Secondary: [place] @ [address] [place] [address] Way [address]  Phone: [phone]  Fax: [phone] Plan Frequency: 2/week x 12 weeks    Plan of Care/Certification Expiration Date: 22      PT Visit Info: Total # of Visits to Date: 34      OUTPATIENT PHYSICAL THERAPY:OP NOTE TYPE: Treatment Note 10/25/2022       Episode  }Appt Desk              Treatment Diagnosis: Difficulty in walking, Not elsewhere classified (R26.2)  Low Back Pain (M54.5) Pain in right hip (M25. 551)  Medical/Referring Diagnosis:  Spondylosis without myelopathy or radiculopathy, lumbosacral region [M47.817]  Chronic pain syndrome [G89.4]  Unilateral primary osteoarthritis, unspecified hip [M16.10]  Referring Physician:  oNe Cedeno MD Orders:  PT Eval and Treat   Date of Onset:  Onset Date: 09/15/15     Allergies:   Patient has no allergy information on record. Restrictions/Precautions:  Restrictions/Precautions: None  No data recorded   Interventions Planned (Treatment may consist of any combination of the following):    Current Treatment Recommendations: Strengthening; ROM; Manual Therapy - Soft Tissue Mobilization; Manual Therapy - Joint Manipulation; Pain management; Home exercise program; Aquatics     Subjective Comments: Pt reports some lumbar pain over the weekend. She reports compliance with the HEP. Initial:}     1/10Post Session:       no increase in pain reported  Medications Last Reviewed:  10/25/2022  Updated Objective Findings:  see progress note 10/13/22  Treatment   THERAPEUTIC EXERCISE: (45 minutes):    Exercises per grid below to improve mobility, strength and balance. Required MOD visual and verbal cues to promote proper body mechanics. Progressed repetitions and complexity of movement as indicated.       Stretching:    - prone quad stretch with verbal cues to relax 1 min hold x4 each (therapist providing over pressure)  -seated hamstring stretch 20 sec hold x6 B  -seated piriformis stretch 20 sec hold x 4 B  -standing hip flexor stretch in the doorway 30 sec hold x 4 each  -standing calf stretch 30 sec hold x 2 B  -standing lumbar stretch at table with stability ball x10 (forward stretch)    Strengthening    -Step ups x15 B LE onto 8 inch  -alternating UE x10 in quadriped  -alternating LE x10 in quadriped with tactile cues to maintain neutral alignment and not rotate hips  -pelvic tilt x15  -supine bridges with LE's on stability ball 3x10    Treatment/Session Summary:    Treatment Assessment: Pt reported mild hip pain with the forward stretch with the stability ball but she demonstrates improved body mechanics. She required tactile cues to tilt the pelvis to neutral during the standing hip flexor stretch and she has difficulty maintaining trunk stability during quadriped activities. Discussed possible discharge in November. Communication/Consultation:    Equipment provided today: none  Recommendations/Intent for next treatment session: Next visit will focus on strengthening the hip and LE's. Continue to progress functional strengthening.     Total Treatment Billable Duration:  45 minutes   Time In: 0930  Time Out: 1020    Beth Hooker PTA       Charge Capture  }Post Session Pain  PT Visit Info  [employer] Portal  MD Guidelines  Scanned Media  Benefits  MyChart    Future Appointments   Date Time Provider Heber Lockett   10/27/2022  9:30 AM Beth Hooker PTA [facility]   11/1/2022  9:30 AM Beth Hooker PTA [facility]   11/3/2022  9:30 AM ORLANDO Ndiaye [facility]   11/8/2022  9:30 AM CARLOTA SniderORPTARLENE O   11/10/2022  9:30 AM ORLANDO NdiayeORPTARLENE [facility]   11/15/2022  9:30 AM Wilbert Stevenson, PT NICKORPPAUL [facility]   11/17/2022  9:30 AM ORLANDO NdiayeTARLENE [facility]   11/29/2022  9:30 AM Beth Hooker PTA [facility]   12/1/2022  9:30 AM Forrestine Evgeny, PTA [facility]   12/6/2022  9:30 AM Forrestine Webster, PTA [facility]   12/8/2022  9:30 AM Forrestine Webster, PTA [facility]   12/13/2022  9:30 AM Forrestine Evgeny, PTA [facility]

## 2022-10-27 ENCOUNTER — HOSPITAL ENCOUNTER (OUTPATIENT)
Dept: PHYSICAL THERAPY | Age: 62
Setting detail: RECURRING SERIES
Discharge: HOME | End: 2022-10-30
Payer: COMMERCIAL

## 2022-10-27 PROCEDURE — 97110 THERAPEUTIC EXERCISES: CPT

## 2022-10-27 NOTE — PROGRESS NOTES
Ruben Pleitez  : 1960  Primary: [employer]  Secondary:  [address] @ [address] [facility] [address]  [address] [place] [address] Way [address]  Phone: [phone]  Fax: [phone] Plan Frequency: 2/week x 12 weeks    Plan of Care/Certification Expiration Date: 22      PT Visit Info: Total # of Visits to Date: 27      OUTPATIENT PHYSICAL THERAPY:OP NOTE TYPE: Treatment Note 10/27/2022       Episode  }Appt Desk              Treatment Diagnosis: Difficulty in walking, Not elsewhere classified (R26.2)  Low Back Pain (M54.5) Pain in right hip (M25. 551)  Medical/Referring Diagnosis:  Spondylosis without myelopathy or radiculopathy, lumbosacral region [M47.817]  Chronic pain syndrome [G89.4]  Unilateral primary osteoarthritis, unspecified hip [M16.10]  Referring Physician:  Hanny Loredo MD Orders:  PT Eval and Treat   Date of Onset:  Onset Date: 09/15/15     Allergies:   Patient has no allergy information on record. Restrictions/Precautions:  Restrictions/Precautions: None  No data recorded   Interventions Planned (Treatment may consist of any combination of the following):    Current Treatment Recommendations: Strengthening; ROM; Manual Therapy - Soft Tissue Mobilization; Manual Therapy - Joint Manipulation; Pain management; Home exercise program; Aquatics     Subjective Comments: Pt reports R hip pain, pain is greater in the morning. Initial:}     1-2/10 R hip Post Session:       no increase in pain reported  Medications Last Reviewed:  10/27/2022  Updated Objective Findings:  see progress note 10/13/22  Treatment   THERAPEUTIC EXERCISE: (35 minutes):    Exercises per grid below to improve mobility, strength and balance. Required MOD visual and verbal cues to promote proper body mechanics. Progressed repetitions and complexity of movement as indicated.       Stretching:    - prone quad stretch with verbal cues to relax 1 min hold x4 each (therapist providing over pressure)  -seated hamstring stretch 30 sec hold x 4 B  -seated piriformis stretch 30 sec hold x 4 B  -standing calf stretch 30 sec hold x 4 B    Strengthening    -lateral Step ups x15 B LE onto 8 inch  -walking forward/back with cables in B hands, 7# each maintain trunk alignment and stability x 4 lengths    Next visit- box lift, dead lift    Treatment/Session Summary:    Treatment Assessment: Pt demonstrated good posture during the cable walk out but she reported increased hip soreness and fatigue with lateral step ups. Pt would benefit from hip strengthening to decrease lumbar pain and compensation. 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 Communication/Consultation:    Equipment provided today: none  Recommendations/Intent for next treatment session: Next visit will focus on strengthening the hip and LE's. Continue to progress functional strengthening.     Total Treatment Billable Duration:  35 minutes (pt was late due to traffic)  Time In: 0940  Time Out: [address], PTA       Charge Capture  }Post Session Pain  PT Visit Info  [employer] Portal  MD Guidelines  Scanned Media  Benefits  MyChart    Future Appointments   Date Time Provider Heber Lockett   11/1/2022  9:30 AM Forcarlito Pepper, PTA [facility]   11/3/2022  9:30 AM Forcarlito Pepper, PTA [facility]   11/8/2022  9:30 AM Justin Manrique, PT [facility]   11/10/2022  9:30 AM Forrestine [place], PTA [facility]   11/15/2022  9:30 AM Justin Manrique, PT [facility]   11/17/2022  9:30 AM Forrestine [place], PTA [facility]   11/29/2022  9:30 AM Forrestine [place], PTA [facility]   12/1/2022  9:30 AM Forrestine Evgeny, PTA [facility]   12/6/2022  9:30 AM Justin Manrique, PT [facility]   12/8/2022  9:45 AM Breanna Kelly, PT [facility]   12/13/2022  9:30 AM Justin Manrique, PT [facility]

## 2022-11-01 ENCOUNTER — HOSPITAL ENCOUNTER (OUTPATIENT)
Dept: PHYSICAL THERAPY | Age: 62
Setting detail: RECURRING SERIES
Discharge: HOME | End: 2022-11-04
Payer: MEDICARE

## 2022-11-01 PROCEDURE — 97110 THERAPEUTIC EXERCISES: CPT

## 2022-11-01 NOTE — PROGRESS NOTES
Jj Willis  : 1960  Primary: Medicare Part A And B  Secondary: [place] @ [facility]0 [address] [address]  Phone: [phone]  Fax: [phone] Plan Frequency: 2/week x 12 weeks    Plan of Care/Certification Expiration Date: 22      PT Visit Info: Total # of Visits to Date: 32      OUTPATIENT PHYSICAL THERAPY:OP NOTE TYPE: Treatment Note 2022       Episode  }Appt Desk              Treatment Diagnosis: Difficulty in walking, Not elsewhere classified (R26.2)  Low Back Pain (M54.5) Pain in right hip (M25. 551)  Medical/Referring Diagnosis:  Spondylosis without myelopathy or radiculopathy, lumbosacral region [M47.817]  Chronic pain syndrome [G89.4]  Unilateral primary osteoarthritis, unspecified hip [M16.10]  Referring Physician:  Rani Campbell MD Orders:  PT Eval and Treat   Date of Onset:  Onset Date: 09/15/15     Allergies:   Patient has no allergy information on record. Restrictions/Precautions:  Restrictions/Precautions: None  No data recorded   Interventions Planned (Treatment may consist of any combination of the following):    Current Treatment Recommendations: Strengthening; ROM; Manual Therapy - Soft Tissue Mobilization; Manual Therapy - Joint Manipulation; Pain management; Home exercise program; Aquatics     Subjective Comments: Pt states that she has some lumbar pain in the center of the lumbar spine. Pt also expressed concern with the intermittent pain but she states that she has much less pain since she started PT. She states that she is usually sore after therapy but the therapist explained that muscle soreness occurs after strengthening exercises. Initial:}     -2/10 lumbar spine Post Session:       Pt reported relief from stretching.   Medications Last Reviewed:  2022  Updated Objective Findings:  see progress note 10/13/22  Treatment   THERAPEUTIC EXERCISE: (45 minutes): Exercises per grid below to improve mobility, strength and balance. Required MOD visual and verbal cues to promote proper body mechanics. Progressed repetitions and complexity of movement as indicated. Pt education on decreasing pain with ice, stretching, drinking water and diet. Pt expressed interest in an anti-inflammatory diet to decrease pain and inflammation. She was issued a hand out on anti-inflammatory eating with a focus on eliminated processed meat, refined carbohydrates, and sugar. Pt is aware of the need to eat more fruit and vegetables and properly hydrate. Stretching:    - prone quad stretch with verbal cues to relax 1 min hold x4 each (therapist providing over pressure)  -supine diana stretch with therapist providing over pressure 1 min hold x 4 B    Strengthening    Box squat with wooden box x5  Dead lift with PVC pipe from floor x5  Golfer's tilt x3 with mat table for support  Bridges 3x10 supine    Treatment/Session Summary:    Treatment Assessment: Treatment focused on proper lifting techniques to decrease lumbar pain with ADLs. Pt was able to demonstrate correct lifting techniques but may need review. The therapist discussed diet changes, getting enough sleep, and hydrating to decrease pain. Pt verbalized understanding. Communication/Consultation:    Equipment provided today: none  Recommendations/Intent for next treatment session: Next visit will focus on strengthening the hip and LE's. 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     Total Treatment Billable Duration:  45 minutes   Time in/out: 09:30 to [address], PTA       Charge Capture  }Post Session Pain  PT Visit Info  [employer] Portal  MD Guidelines  Scanned Media  Benefits  MyChart    Future Appointments   Date Time Provider Heber Lockett   11/3/2022  9:30 AM Alisson Larios PTA [facility]   11/8/2022  9:30 AM Curt Ibarra Leticia, PT [facility]   11/10/2022  9:30 AM Benito Mixon, PTA [facility]   11/15/2022  9:30 AM Breanna Kelly, PT [facility]   11/17/2022  9:30 AM Benito Mixon, PTA [facility]   11/29/2022  9:30 AM Benito Mixon, PTA [facility]   12/1/2022  9:30 AM Benito Mixon, PTA [facility]   12/6/2022  9:30 AM Jofrancada Desirae, PT [facility]   12/8/2022  9:45 AM Breanna Kelly, PT [facility]   12/13/2022  9:30 AM Jofrancada Desirae, PT [facility]

## 2022-11-03 ENCOUNTER — HOSPITAL ENCOUNTER (OUTPATIENT)
Dept: PHYSICAL THERAPY | Age: 62
Setting detail: RECURRING SERIES
Discharge: HOME | End: 2022-11-06
Payer: MEDICARE

## 2022-11-03 PROCEDURE — 97110 THERAPEUTIC EXERCISES: CPT

## 2022-11-03 NOTE — PROGRESS NOTES
Timmy Diez  : 1960  Primary: Medicare Part A And B  Secondary: [facility] @ [address] [place] [address] Way [address]  Phone: [phone]  Fax: [phone] Plan Frequency: 2/week x 12 weeks    Plan of Care/Certification Expiration Date: 22      PT Visit Info: Total # of Visits to Date: 28      OUTPATIENT PHYSICAL THERAPY:OP NOTE TYPE: Treatment Note 11/3/2022       Episode  }Appt Desk              Treatment Diagnosis: Difficulty in walking, Not elsewhere classified (R26.2)  Low Back Pain (M54.5) Pain in right hip (M25. 551)  Medical/Referring Diagnosis:  Spondylosis without myelopathy or radiculopathy, lumbosacral region [M47.817]  Chronic pain syndrome [G89.4]  Unilateral primary osteoarthritis, unspecified hip [M16.10]  Referring Physician:  Janey Allen MD Orders:  PT Eval and Treat   Date of Onset:  Onset Date: 09/15/15     Allergies:   Patient has no allergy information on record. Restrictions/Precautions:  Restrictions/Precautions: None  No data recorded   Interventions Planned (Treatment may consist of any combination of the following):    Current Treatment Recommendations: Strengthening; ROM; Manual Therapy - Soft Tissue Mobilization; Manual Therapy - Joint Manipulation; Pain management; Home exercise program; Aquatics     Subjective Comments: Pt expressed concern about soreness after stretching and strengthening. She states that she will be discharged from respiratory therapy and respiratory therapy gave her a home exercise program.      Initial:}     mild pain lumbar spine and hips Post Session:       No increase reported  Medications Last Reviewed:  11/3/2022  Updated Objective Findings:  see progress note 10/13/22  Treatment   THERAPEUTIC EXERCISE: (40 minutes):    Exercises per grid below to improve mobility, strength and balance. Required MOD visual and verbal cues to promote proper body mechanics. Progressed repetitions and complexity of movement as indicated. Stretching:    - prone quad stretch with verbal cues to relax 1 min hold x4 each (therapist providing over pressure)  -supine diana stretch with therapist providing over pressure 1 min hold x 4 B    Strengthening    Dead lift with PVC pipe from floor x5  Modified prone rows 5# 2x10 B  Triceps in modified prone 5# x10 each  Step ups 8 inch with wall for support x10 ea LE      Treatment/Session Summary:    Treatment Assessment: Pt did not report increased pain during exercise but she states that she is usually sore after therapy. She is currently doing 45 min of PT followed by an hour of respiratory therapy. The therapist explained that some soreness and fatigue is to be expected. Discussed possible discharge with the pt. She demonstrates good body mechanics with cueing. Communication/Consultation:    Equipment provided today: none  Recommendations/Intent for next treatment session: Next visit will focus on strengthening the hip and LE's. 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     Total Treatment Billable Duration:  40 minutes   Time in/out: 09:30 to 1015    Reagan Aaron PTA       Charge Capture  }Post Session Pain  PT Visit Info  [facility] Portal  MD Guidelines  Scanned Media  Benefits  MyChart    Future Appointments   Date Time Provider Heber Lockett   11/8/2022  9:30 AM Chandu Vitale, PT [facility]   11/10/2022  9:30 AM Reagan Aaron PTA [facility]   11/15/2022  9:30 AM Chandu Vitale, PT [facility]   11/17/2022  9:30 AM Reagan Aaron PTA [facility]   11/29/2022  9:30 AM Reagan Aaron PTA [facility]   12/1/2022  9:30 AM Reagan Aaron PTA [facility]   12/6/2022  9:30 AM Breanna Kelly, PT [facility]   12/8/2022  9:45 AM Breanna Kelly, PT [facility]   12/13/2022  9:30 AM Chandu Vitale, PT [facility]

## 2022-11-08 ENCOUNTER — HOSPITAL ENCOUNTER (OUTPATIENT)
Dept: PHYSICAL THERAPY | Age: 62
Setting detail: RECURRING SERIES
Discharge: HOME | End: 2022-11-11
Payer: MEDICARE

## 2022-11-08 PROCEDURE — 97110 THERAPEUTIC EXERCISES: CPT

## 2022-11-08 NOTE — PROGRESS NOTES
Jennie Roque  : 1960  Primary:   Secondary:  [address] @ [address] [address]  Phone: [phone]  Fax: [phone] Plan Frequency: 1-2x/week for 4-5 more weeks. Plan of Care/Certification Expiration Date: 12/15/22      PT Visit Info: Total # of Visits to Date: 35      OUTPATIENT PHYSICAL THERAPY:OP NOTE TYPE: Treatment Note 2022       Episode  }Appt Desk              Treatment Diagnosis: Difficulty in walking, Not elsewhere classified (R26.2)  Low Back Pain (M54.5) Pain in right hip (M25. 551)  Medical/Referring Diagnosis:  Spondylosis without myelopathy or radiculopathy, lumbosacral region [M47.817]  Chronic pain syndrome [G89.4]  Unilateral primary osteoarthritis, unspecified hip [M16.10]  Referring Physician:  Sara Tobar*  MD Orders:  PT Eval and Treat   Date of Onset:  Onset Date: 09/15/15     Allergies:   Patient has no allergy information on record. Restrictions/Precautions:  Restrictions/Precautions: None  No data recorded   Interventions Planned (Treatment may consist of any combination of the following):    Current Treatment Recommendations: Strengthening; ROM; Manual Therapy - Soft Tissue Mobilization; Manual Therapy - Joint Manipulation; Pain management; Home exercise program; Aquatics     Subjective Comments:   Pt reports she feels stronger but wants to have better balance and better endurance. She feels like the stretches really help. Initial:}     mild pain lumbar spine and hips Post Session:       No increase reported  Medications Last Reviewed:  2022  Updated Objective Findings:  see progress note 22  Treatment   THERAPEUTIC EXERCISE: (40 minutes):    Exercises per grid below to improve mobility, strength and balance. Required MOD visual and verbal cues to promote proper body mechanics. Progressed repetitions and complexity of movement as indicated.       Stretching  - prone quad stretch with verbal cues to relax 1 min hold x4 each (therapist providing over pressure)  -supine diana stretch with therapist providing over pressure 1 min hold x 4 B  - standing with foot on chair and leaning forward for hip flexor stretch on standing leg, verbal cues for posture, posterior pelvic tilt 30\"ea leg. Strengthening  Bridges 2x10  Prone knee flexion 10x  Prone hip extension 10x  Sit to stand at 24 1/2 inch table  just touching bottom and back up  2x10  Dead lift 5#  to stool 2x10   Modified prone rows 5# 2x10 B  Step ups 8 inch with wall for support x10 ea LE      Treatment/Session Summary:    Treatment Assessment:   She had difficulty with sit to stand with just tapping and not sitting all the way down. She scored well on the Sharp balance test with decreased single leg balance. Communication/Consultation:    Equipment provided today: none  Recommendations/Intent for next treatment session: Next visit will focus on strengthening the hip and LE's. 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     Total Treatment Billable Duration:  40 minutes  Time In: 6685  Time Out: 1922    Galindo Blas, CARLOTA           Charge Capture  }Post Session Pain  PT Visit Info  [email or web address] Portal  MD Guidelines  Scanned Media  Benefits  Madeleine Markethart    Future Appointments   Date Time Provider Heber Lockett   11/10/2022  9:30 AM Inez Mendoza PTA [facility]   11/15/2022  9:30 AM Galindo Blas PT [facility]   11/17/2022  9:30 AM Inez Mendoza PTA [facility]   11/29/2022  9:30 AM Inez Mendoza PTA [facility]   12/1/2022  9:30 AM Inez Mendoza PTA [facility]   12/6/2022  9:30 AM Galindo Blas PT [facility]   12/8/2022  9:45 AM Breanna Kelly PT [facility]   12/13/2022  9:30 AM Galindo Blas PT [facility]

## 2022-11-08 NOTE — THERAPY RECERTIFICATION
Jennifer Lerma  : 1960  Primary:   Secondary:  [address] @ [address] [address]  Phone: [phone]  Fax: [phone] Plan Frequency: 1-2x/week for 4-5 more weeks. Plan of Care/Certification Expiration Date: 12/15/22      PT Visit Info: Total # of Visits to Date: 35      OUTPATIENT PHYSICAL THERAPY:  Progress Report 2022               Episode  Appt Desk         Treatment Diagnosis:  Difficulty in walking, Not elsewhere classified (R26.2)  Low Back Pain (M54.5) Pain in right hip (M25. 551)  Medical/Referring Diagnosis:  Spondylosis without myelopathy or radiculopathy, lumbosacral region [M47.817]  Chronic pain syndrome [G89.4]  Unilateral primary osteoarthritis, unspecified hip [M16.10]  Referring Physician:  Johnny Soto MD Orders:  PT Eval and Treat Aquatics  Return MD Appt:    Date of Onset:  Onset Date: 09/15/15     Allergies:  Patient has no allergy information on record. Restrictions/Precautions:    Restrictions/Precautions: None  No data recorded   Medications Last Reviewed:  2022        OBJECTIVE   Walking:  ambulates with decreased step length bilateral, slight trunk flexion  Stairs:  pt ambulates with step over step ascending and descending with hand rail with wide base of support descending, difficulty pushing off right LE ascending. Standing: she is able to stand for 30 minutes; she reports she is able to stand an cook an entire meal  Pain: 10. Sharp/56   Balance: single leg balance R LE 5 sec, L LE 10 sec    ASSESSMENT   Assessment: Jennifer Lerma presents to physical therapy with complaints of low back and R hip pain. She is progressing with LE strength and endurance and reports of decreased pain.  She reports she is able to stand for up to 30 minutes and now able to cook a complete meal.  Negotiating stairs has improved and she is now able to use reciprocal pattern for ascending and descending but needs to work on LE control with both. She continues with decreased flexibility and tightness in hip extension bilateral. She will benefit from continuing skilled physical therapy to continue to progress functional mobility. Problem List: (Impacting functional limitations): Body Structures, Functions, Activity Limitations Requiring Skilled Therapeutic Intervention: Decreased functional mobility ; Decreased ROM; Decreased strength; Increased pain; Decreased posture        PLAN   Effective Dates: 11-8-22 TO Plan of Care/Certification Expiration Date: 12/15/22     Frequency/Duration: Plan Frequency: 1-2x/week for 4-5 more weeks. Interventions Planned (Treatment may consist of any combination of the following):    Current Treatment Recommendations: Strengthening; ROM; Manual Therapy - Soft Tissue Mobilization; Manual Therapy - Joint Manipulation; Pain management; Home exercise program; Aquatics     Goals: (Goals have been discussed and agreed upon with patient.)  Short-Term Functional Goals: Time Frame: 3-4 weeks  Pt to perform 45 minutes of aquatic therapy 2/week consistently  MET  Pt to report a decrease in pain either intensity or frequency  MET    Discharge Goals: Time Frame: 9-12 weeks  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 MET 9-8-22  Improve Oswestry score by 10 or greater indicating improved function. Goal MET 11-8-22  Improve hip flexibility to accommodate longer stepping  progressing 12-8-22  Up/down  6 stairs with hand railing with greater ease with step over step gait. Progressing towards 11-8-22  NEW GOAL  6. Pt will be independent with progressed HEP to be ready for discharge. Outcome Measure:    Tool Used: Modified Oswestry Low Back Pain   Score:  Initial: 28/50 23/50 (Date: 9-8-22) 17/50  (Date 11-8-22)   Interpretation of Score: Each section is scored on a 0-5 scale, 5 representing the greatest disability. The scores of each section are added together for a total score of 50. Tool Used: Timed Up and Go (TUG)  Score:  Initial: 12.5 seconds Most Recent: 9.5 seconds (Date: 9-8-22 )   Interpretation of Score: The test measures, in seconds, the time taken by an individual to stand up from a standard arm chair (seat height 46 cm [18 in], arm height 65 cm [25.6 in]), walk a distance of 3 meters (118 in, approx 10 ft), turn, walk back to the chair and sit down. If the individual takes longer than 14 seconds to complete TUG, this indicates risk for falls. Medical Necessity:   Patient is expected to demonstrate progress in strength, range of motion, balance and coordination to improve safety during ambulation and stair use. Also improve endurance for cooking and d=adls. .  Reason For Services/Other Comments:  She continues to require moderate verbal cueing for body positioning with exercises     Regarding Neil Kowalski's therapy, I certify that the treatment plan above will be carried out by a therapist or under their direction.   Thank you for this referral,  Shawn Kaplan, PT       Referring Physician Signature: Sara Tobar* _______________________________ Date _____________       Post Session Pain  Charge Capture  PT Visit Info  POC Link  Treatment Note Link  MD Guidelines  MyChart

## 2022-11-10 ENCOUNTER — HOSPITAL ENCOUNTER (OUTPATIENT)
Dept: PHYSICAL THERAPY | Age: 62
Setting detail: RECURRING SERIES
Discharge: HOME | End: 2022-11-13
Payer: MEDICARE

## 2022-11-10 PROCEDURE — 97110 THERAPEUTIC EXERCISES: CPT

## 2022-11-10 ASSESSMENT — PAIN SCALES - GENERAL: PAINLEVEL_OUTOF10: 1

## 2022-11-10 NOTE — PROGRESS NOTES
Salma Box  : 1960  Primary: Medicare Part A And B  Secondary:  [address] [facility] @ [address] [address]  Phone: [phone]  Fax: [phone] Plan Frequency: 1-2x/week for 4-5 more weeks. Plan of Care/Certification Expiration Date: 12/15/22      PT Visit Info: Total # of Visits to Date: 29      OUTPATIENT PHYSICAL THERAPY:OP NOTE TYPE: Treatment Note 11/10/2022       Episode  }Appt Desk              Treatment Diagnosis: Difficulty in walking, Not elsewhere classified (R26.2)  Low Back Pain (M54.5) Pain in right hip (M25. 551)  Medical/Referring Diagnosis:  Spondylosis without myelopathy or radiculopathy, lumbosacral region [M47.817]  Chronic pain syndrome [G89.4]  Unilateral primary osteoarthritis, unspecified hip [M16.10]  Referring Physician:  Chrissie Angelucci*  MD Orders:  PT Eval and Treat   Date of Onset:  Onset Date: 09/15/15     Allergies:   Patient has no allergy information on record. Restrictions/Precautions:  Restrictions/Precautions: None  No data recorded   Interventions Planned (Treatment may consist of any combination of the following):    Current Treatment Recommendations: Strengthening; ROM; Manual Therapy - Soft Tissue Mobilization; Manual Therapy - Joint Manipulation; Pain management; Home exercise program; Aquatics     Subjective Comments:   Pt reports mild R hip pain today. Initial:}    1/10 Post Session:       no increase  Medications Last Reviewed:  11/10/2022  Updated Objective Findings:  see progress note 22  Treatment   THERAPEUTIC EXERCISE: (40 minutes):    Exercises per grid below to improve mobility, strength and balance. Required MOD visual and verbal cues to promote proper body mechanics. Progressed repetitions and complexity of movement as indicated.       Stretching  - prone quad stretch with verbal cues to relax 1 min hold x4 each (therapist providing over pressure)  -supine diana stretch with therapist providing over pressure 1 min hold x 4 B (have pt complete a pelvic tilt next visit)  - standing with foot on chair and leaning forward for hip flexor stretch on standing leg, verbal cues for posture, posterior pelvic tilt 30\" x4 ea leg. Strengthening    Sit to stand at 24 1/2 inch table  just touching bottom and back up  x10. Lowered to 23. 5# x10  Dead lift 5#  to stool 2x10   Bridges 3x10      Treatment/Session Summary:    Treatment Assessment:   Pt continues to requires verbal and visual cues for the strengthening exercises. Pt has improved strength with sit to stands but requires encouragement. Communication/Consultation:    Equipment provided today: none  Recommendations/Intent for next treatment session: Next visit will focus on strengthening the hip and LE's. 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     Total Treatment Billable Duration:  40 minutes  Time In: 0930  Time Out: 1015    Lacy Gens, PTA           Charge Capture  }Post Session Pain  PT Visit Info  [employer] Portal  MD Guidelines  Scanned Media  Benefits  MyChart    Future Appointments   Date Time Provider Heber Lockett   11/15/2022  9:30 AM Corettane Soulier, PT [facility]   11/17/2022  9:30 AM Lacy Gens, PTA [facility]   11/29/2022  9:30 AM Lacy Gens, PTA [facility]   12/1/2022  9:30 AM Lacy Gens, PTA [facility]   12/6/2022  9:30 AM Elene Soulier, PT [facility]   12/8/2022  9:45 AM Breanna Kelly, PT [facility]   12/13/2022  9:30 AM Elene Soulier, PT [facility]

## 2022-11-15 ENCOUNTER — HOSPITAL ENCOUNTER (OUTPATIENT)
Dept: PHYSICAL THERAPY | Age: 62
Setting detail: RECURRING SERIES
Discharge: HOME | End: 2022-11-18
Payer: MEDICARE

## 2022-11-15 PROCEDURE — 97110 THERAPEUTIC EXERCISES: CPT

## 2022-11-17 ENCOUNTER — HOSPITAL ENCOUNTER (OUTPATIENT)
Dept: PHYSICAL THERAPY | Age: 62
Setting detail: RECURRING SERIES
End: 2022-11-17
Payer: MEDICARE

## 2022-11-29 ENCOUNTER — HOSPITAL ENCOUNTER (OUTPATIENT)
Dept: PHYSICAL THERAPY | Age: 62
Setting detail: RECURRING SERIES
Discharge: HOME | End: 2022-12-02
Payer: MEDICARE

## 2022-11-29 PROCEDURE — 97110 THERAPEUTIC EXERCISES: CPT

## 2022-11-29 NOTE — PROGRESS NOTES
Cyril Aguilar  : 1960  Primary: Medicare Part A And B  Secondary: [facility] @ [address] [address]  Phone: [phone]  Fax: [phone] Plan Frequency: 1-2x/week for 4-5 more weeks. Plan of Care/Certification Expiration Date: 12/15/22      PT Visit Info: Total # of Visits to Date: 39      OUTPATIENT PHYSICAL THERAPY:OP NOTE TYPE: Treatment Note 2022       Episode  }Appt Desk              Treatment Diagnosis: Difficulty in walking, Not elsewhere classified (R26.2)  Low Back Pain (M54.5) Pain in right hip (M25. 551)  Medical/Referring Diagnosis:  Spondylosis without myelopathy or radiculopathy, lumbosacral region [M47.817]  Chronic pain syndrome [G89.4]  Unilateral primary osteoarthritis, unspecified hip [M16.10]  Referring Physician:  Esther Gan MD Orders:  PT Eval and Treat   Date of Onset:  Onset Date: 09/15/15     Allergies:   Patient has no allergy information on record. Restrictions/Precautions:  Restrictions/Precautions: None  No data recorded   Interventions Planned (Treatment may consist of any combination of the following):    Current Treatment Recommendations: Strengthening; ROM; Manual Therapy - Soft Tissue Mobilization; Manual Therapy - Joint Manipulation; Pain management; Home exercise program; Aquatics     Subjective Comments:   Pt reports she walked her nephews dog for a few days while they were out of town and she did pretty good. Initial:     /10 Post Session:        No VAS  Medications Last Reviewed:  2022  Updated Objective Findings:    Treatment   THERAPEUTIC EXERCISE: (40 minutes):    Exercises per grid below to improve mobility, strength and balance. Required MOD visual and verbal cues to promote proper body mechanics. Progressed repetitions and complexity of movement as indicated.       Stretching  - prone quad stretch with verbal cues to relax 1 min hold x3 each then with vita disc under leg and quad/hip flexor stretch 30\"x2 (therapist providing over pressure)  - standing with foot on chair and leaning forward for hip flexor stretch on standing leg, verbal cues for posture, posterior pelvic tilt 30\" x4 ea leg. Strengthening  Sit to stand at 23 1/2 inch table  just touching bottom and back up 2x10  Dead lift 8#  to stool 2x10   Bridges 3x10  Step ups 6\" 10x  Shuttle press 75# 2x10   Single leg balance 15-30\"x3 ea  Gastroc stretch on incline 30\"x2      Treatment/Session Summary:    Treatment Assessment:   Posture seems to be improving and she is more aware of her posture. She had more difficulty wtih single leg balance on R LE vs L LE. Communication/Consultation:    Equipment provided today: none  Recommendations/Intent for next treatment session: Next visit will focus on strengthening the hip and LE's. 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     Total Treatment Billable Duration:  40 minutes  Time In: 9759  Time Out: 1120    Breanna Kelly PT           Charge Capture  }Post Session Pain  PT Visit Info  [employer] Portal  MD Guidelines  Scanned Media  Benefits  MyChart    Future Appointments   Date Time Provider Heber Lockett   12/1/2022  9:30 AM Shelli Garcia PTA [facility] [place]   12/6/2022  9:30 AM CARLOTA Saucedo   12/8/2022  9:45 AM CARLOTA Saucedo   12/13/2022  9:30 AM CARLOTA Saucedo

## 2022-12-01 ENCOUNTER — HOSPITAL ENCOUNTER (OUTPATIENT)
Dept: PHYSICAL THERAPY | Age: 62
Setting detail: RECURRING SERIES
Discharge: HOME | End: 2022-12-04
Payer: MEDICARE

## 2022-12-01 PROCEDURE — 97110 THERAPEUTIC EXERCISES: CPT

## 2022-12-01 NOTE — PROGRESS NOTES
Deonte Auguste  : 1960  Primary: Medicare Part A [place] B  Secondary: [facility] @ [facility] [address] [address]  Phone: [phone]  Fax: [phone] Plan Frequency: 1-2x/week for 4-5 more weeks. Plan of Care/Certification Expiration Date: 12/15/22      PT Visit Info: Total # of Visits to Date: 40      OUTPATIENT PHYSICAL THERAPY:OP NOTE TYPE: Treatment Note 2022       Episode  }Appt Desk              Treatment Diagnosis: Difficulty in walking, Not elsewhere classified (R26.2)  Low Back Pain (M54.5) Pain in right hip (M25. 551)  Medical/Referring Diagnosis:  Spondylosis without myelopathy or radiculopathy, lumbosacral region [M47.817]  Chronic pain syndrome [G89.4]  Unilateral primary osteoarthritis, unspecified hip [M16.10]  Referring Physician:  Javon Canada MD Orders:  PT Eval and Treat   Date of Onset:  Onset Date: 09/15/15     Allergies:   Patient has no allergy information on record. Restrictions/Precautions:  Restrictions/Precautions: None  No data recorded   Interventions Planned (Treatment may consist of any combination of the following):    Current Treatment Recommendations: Strengthening; ROM; Manual Therapy - Soft Tissue Mobilization; Manual Therapy - Joint Manipulation; Pain management; Home exercise program; Aquatics     Subjective Comments:   Pt states \"I barely have any pain. I feel the right hip. \"  Initial:}     very mild pain in R hip Post Session:      0/10  Medications Last Reviewed:  2022  Updated Objective Findings:    Treatment   THERAPEUTIC EXERCISE: (45 minutes):    Exercises per grid below to improve mobility, strength and balance. Required MOD visual and verbal cues to promote proper body mechanics. Progressed repetitions and complexity of movement as indicated.       Stretching  - prone quad stretch with verbal cues to relax 1 min hold x4 B LE with therapist providing overpressure  - supine (modified Abraham stretch) 1 min hold x 4 B LE with therapist providing overpressure. Then pt performed modified Abraham stretch with a strap for HEP. -self massage with tennis ball to release R piriforms and glutes, seated on mat table x 30 sec    Strengthening  Sit to stand at 23 1/2 inch table  just touching bottom and back up 3x10 with 8# dumbbell  Dead lift 8#  to stool 2x10   Bridges 3x10  Step ups 8 inch x15 each LE with no UE support  Shuttle press 75# 3x10   Single leg balance 15-30\"x3 ea in doorway for support  Gastroc stretch on incline 30\"x2      Treatment/Session Summary:    Treatment Assessment:   Reviewed the stretches for the HEP. Pt did not report increased pain but requires verbal and visual cues for correct body mechanics for dead lifts. Communication/Consultation:    Equipment provided today: none  Recommendations/Intent for next treatment session: Next visit will focus on strengthening the hip and LE's. 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     Total Treatment Billable Duration:  45 minutes  Time In: 0930  Time Out: 1020    [place] PTA           Charge Capture  }Post Session Pain  PT Visit Info  [facility] Portal  MD Guidelines  Scanned Media  Benefits  MyChart    Future Appointments   Date Time Provider Heber Lockett   12/6/2022  9:30 AM Justice Goltz, PT [facility]   12/8/2022  9:45 AM Justice Goltz, PT [facility]   12/13/2022  9:30 AM Justice Goltz, PT [facility]

## 2022-12-06 ENCOUNTER — HOSPITAL ENCOUNTER (OUTPATIENT)
Dept: PHYSICAL THERAPY | Age: 62
Setting detail: RECURRING SERIES
Discharge: HOME | End: 2022-12-09
Payer: MEDICARE

## 2022-12-06 PROCEDURE — 97110 THERAPEUTIC EXERCISES: CPT

## 2022-12-06 NOTE — PROGRESS NOTES
Tiffany Ibarra  : 1960  Primary: Medicare Part A And B  Secondary: [facility] @ [address] [place] [address] Way [address]  Phone: [phone]  Fax: [phone] Plan Frequency: 1-2x/week for 4-5 more weeks. Plan of Care/Certification Expiration Date: 12/15/22      PT Visit Info: Total # of Visits to Date: 45      OUTPATIENT PHYSICAL THERAPY:OP NOTE TYPE: Treatment Note 2022       Episode  }Appt Desk              Treatment Diagnosis: Difficulty in walking, Not elsewhere classified (R26.2)  Low Back Pain (M54.5) Pain in right hip (M25. 551)  Medical/Referring Diagnosis:  Spondylosis without myelopathy or radiculopathy, lumbosacral region [M47.817]  Chronic pain syndrome [G89.4]  Unilateral primary osteoarthritis, unspecified hip [M16.10]  Referring Physician:  Sachin Brantley MD Orders:  PT Eval and Treat   Date of Onset:  Onset Date: 09/15/15     Allergies:   Patient has no allergy information on record. Restrictions/Precautions:  Restrictions/Precautions: None  No data recorded   Interventions Planned (Treatment may consist of any combination of the following):    Current Treatment Recommendations: Strengthening; ROM; Manual Therapy - Soft Tissue Mobilization; Manual Therapy - Joint Manipulation; Pain management; Home exercise program; Aquatics     Subjective Comments:   Pt reports she is a little out of breathing walking in from the parking lot today. Initial:}    Post Session:       /10 no rated  Medications Last Reviewed:  2022  Updated Objective Findings:    Treatment   THERAPEUTIC EXERCISE: (40 minutes):    Exercises per grid below to improve mobility, strength and balance. Required MOD visual and verbal cues to promote proper body mechanics. Progressed repetitions and complexity of movement as indicated.       Stretching  - prone quad stretch with verbal cues to relax 1 min hold x2 B LE with therapist providing overpressure  - supine (modified Abraham stretch) 1 min hold x 2 B LE with therapist providing overpressure. Strengthening  Sit to stand at 23 1/2 inch table  just touching bottom and back up 3x10 with 8# dumbbell  Dead lift 8#  to stool 2x10   Prone hip extension 2x10  Bridges 3x10  Step ups 8 inch x15 each LE with no UE support  Shuttle press 75# 3x10   Single leg balance 15-30\"x3 ea in doorway for support  Gastroc stretch on incline 30\"x2  Walking forward and backward 30 ft x2 and side stepping 30 ftx2    Treatment/Session Summary:    Treatment Assessment:   Continues with tightness in bilateral hip flexors with modified abraham stretch. She worked hard with exercises. Communication/Consultation:    Equipment provided today: none  Recommendations/Intent for next treatment session: Next visit will focus on strengthening the hip and LE's. 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     Total Treatment Billable Duration:  45 minutes  Time In: 8497  Time Out: 1015    Augustin Bruner PT           Charge Capture  }Post Session Pain  PT Visit Info  [facility] Portal  MD Guidelines  Scanned Media  Benefits  MyChart    Future Appointments   Date Time Provider Heber Lockett   12/8/2022  9:45 AM CARLOTA Ward   12/13/2022  9:30 AM CARLOTA Ward

## 2022-12-08 ENCOUNTER — HOSPITAL ENCOUNTER (OUTPATIENT)
Dept: PHYSICAL THERAPY | Age: 62
Setting detail: RECURRING SERIES
Discharge: HOME | End: 2022-12-11
Payer: MEDICARE

## 2022-12-08 PROCEDURE — 97110 THERAPEUTIC EXERCISES: CPT

## 2022-12-08 NOTE — PROGRESS NOTES
Rosa Andino  : 1960  Primary: Medicare Part A And B  Secondary: [facility] @ [address] [place] [address] Way [address]  Phone: [phone]  Fax: [phone] Plan Frequency: 1-2x/week for 4-5 more weeks. Plan of Care/Certification Expiration Date: 12/15/22      PT Visit Info: Total # of Visits to Date: 44      OUTPATIENT PHYSICAL THERAPY:OP NOTE TYPE: Treatment Note 2022       Episode  }Appt Desk              Treatment Diagnosis: Difficulty in walking, Not elsewhere classified (R26.2)  Low Back Pain (M54.5) Pain in right hip (M25. 551)  Medical/Referring Diagnosis:  Spondylosis without myelopathy or radiculopathy, lumbosacral region [M47.817]  Chronic pain syndrome [G89.4]  Unilateral primary osteoarthritis, unspecified hip [M16.10]  Referring Physician:  Garret Stapleton MD Orders:  PT Eval and Treat   Date of Onset:  Onset Date: 09/15/15     Allergies:   Patient has no allergy information on record. Restrictions/Precautions:  Restrictions/Precautions: None  No data recorded   Interventions Planned (Treatment may consist of any combination of the following):    Current Treatment Recommendations: Strengthening; ROM; Manual Therapy - Soft Tissue Mobilization; Manual Therapy - Joint Manipulation; Pain management; Home exercise program; Aquatics     Subjective Comments:   Pt reports the therapy has really helped and now she has minimal pain in her back. Initial:}    Post Session:       /10 no rated  Medications Last Reviewed:  2022  Updated Objective Findings:    Treatment   THERAPEUTIC EXERCISE: (40 minutes):    Exercises per grid below to improve mobility, strength and balance. Required MOD visual and verbal cues to promote proper body mechanics. Progressed repetitions and complexity of movement as indicated.       Stretching  - prone quad stretch with verbal cues to relax 1 min hold x2 B LE with therapist providing overpressure  - supine (modified Abraham stretch) 1 min hold x 2 B LE with therapist providing overpressure. Strengthening  Sit to stand at 23 1/2 inch table  just touching bottom and back up 3x10 with 8# dumbbell  Dead lift 8#  to stool 2x10   Prone hip extension 2x10  Bridges 3x10  Step ups 8 inch x15 each LE with no UE support  Shuttle press 75# 3x10   Single leg balance 15-30\"x3 ea in doorway for support  Gastroc stretch on incline 30\"x2  Walking forward and backward 30 ft x2 and side stepping 30 ftx2 and marching 30 ft x2  Standing gastroc stretch 30\" ea and standing hip flexor stretch with foot on chair 30\" ea    Treatment/Session Summary:    Treatment Assessment:   She required minimal verbal and visual cueing for trunk positioning with exercises. anticpate discharge after next session.   Communication/Consultation:    Equipment provided today: none  Recommendations/Intent for next treatment session: review HEP for discharge     Total Treatment Billable Duration:  40 minutes  Time In: 0945  Time Out: 1030    Breanna Kelly, PT           Charge Capture  }Post Session Pain  PT Visit Info  [facility] Portal  MD Guidelines  Scanned Media  Benefits  MyChart    Future Appointments   Date Time Provider Heber Lockett   12/13/2022  9:30 AM Ivelisse Wells, PT [facility]

## 2022-12-13 ENCOUNTER — HOSPITAL ENCOUNTER (OUTPATIENT)
Dept: PHYSICAL THERAPY | Age: 62
Setting detail: RECURRING SERIES
Discharge: HOME | End: 2022-12-16
Payer: MEDICARE

## 2022-12-13 PROCEDURE — 97110 THERAPEUTIC EXERCISES: CPT

## 2022-12-13 NOTE — THERAPY DISCHARGE
Rosa Andino  : 1960  Primary: Medicare Part A And B  Secondary: [facility] @ [address] [address]  Phone: [phone]  Fax: [phone]    PT Visit Info: Total # of Visits to Date: 36      OUTPATIENT PHYSICAL THERAPY:  Therapy discharge note 2022               Episode  Appt Desk         Treatment Diagnosis:  Difficulty in walking, Not elsewhere classified (R26.2)  Low Back Pain (M54.5) Pain in right hip (M25. 551)  Medical/Referring Diagnosis:  Spondylosis without myelopathy or radiculopathy, lumbosacral region [M47.817]  Chronic pain syndrome [G89.4]  Unilateral primary osteoarthritis, unspecified hip [M16.10]  Referring Physician:  Meagan Landon MD Orders:  PT Eval and Treat Aquatics  Return MD Appt:    Date of Onset:  Onset Date: 09/15/15      OBJECTIVE   Walking:  ambulates with decreased step length bilateral, slight trunk flexion  Stairs:  pt ambulates with step over step ascending and descending with hand rail with wide base of support descending,  Standing: she is able to stand for 30 minutes; she reports she is able to stand an cook an entire meal  Pain: /10. Sharp/56   Balance: single leg balance R LE 20 sec, L LE 20 sec    ASSESSMENT   Discharge Assessment: Rosa Andino presents to physical therapy with complaints of low back and R hip pain. She progressed with LE strength and endurance and reports of decreased pain. She reports she is able to stand for up to 30 minutes and now able to cook a complete meal.  She has met all therapy goals and is ready to be discharged from physical therapy.        PLAN   Discharge patient from physical therapy      Goals: (Goals have been discussed and agreed upon with patient.)  Short-Term Functional Goals: Time Frame: 3-4 weeks  Pt to perform 45 minutes of aquatic therapy 2/week consistently  MET  Pt to report a decrease in pain either intensity or frequency  MET    Discharge Goals: Time Frame: 9-12 weeks  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 MET 9-8-22  Improve Oswestry score by 10 or greater indicating improved function. Goal MET 11-8-22  Improve hip flexibility to accommodate longer stepping  GOAL MET  Up/down  6 stairs with hand railing with greater ease with step over step gait. GOAL MET  NEW GOAL  6. Pt will be independent with progressed HEP to be ready for discharge. GOAL MET         Outcome Measure: Tool Used: Modified Oswestry Low Back Pain   Score:  Initial: 28/50 23/50 (Date: 9-8-22) 17/50  (Date 11-8-22)   Interpretation of Score: Each section is scored on a 0-5 scale, 5 representing the greatest disability. The scores of each section are added together for a total score of 50. Tool Used: Timed Up and Go (TUG)  Score:  Initial: 12.5 seconds Most Recent: 9.5 seconds (Date: 9-8-22 )   Interpretation of Score: The test measures, in seconds, the time taken by an individual to stand up from a standard arm chair (seat height 46 cm [18 in], arm height 65 cm [25.6 in]), walk a distance of 3 meters (118 in, approx 10 ft), turn, walk back to the chair and sit down. If the individual takes longer than 14 seconds to complete TUG, this indicates risk for falls.           Post Session Pain  Charge Capture  PT Visit Info  POC Link  Treatment Note Link  MD Guidelines  Alexi

## 2022-12-13 NOTE — PROGRESS NOTES
Anna Sánchez  : 1960  Primary: Medicare Part A And B  Secondary: [facility] @ [facility] [address]  Phone: [phone]  Fax: [phone] Plan Frequency: 1-2x/week for 4-5 more weeks. Plan of Care/Certification Expiration Date: 12/15/22      PT Visit Info: Total # of Visits to Date: 36      OUTPATIENT PHYSICAL THERAPY:OP NOTE TYPE: Treatment Note 2022       Episode  }Appt Desk              Treatment Diagnosis: Difficulty in walking, Not elsewhere classified (R26.2)  Low Back Pain (M54.5) Pain in right hip (M25. 551)  Medical/Referring Diagnosis:  Spondylosis without myelopathy or radiculopathy, lumbosacral region [M47.817]  Chronic pain syndrome [G89.4]  Unilateral primary osteoarthritis, unspecified hip [M16.10]  Referring Physician:  Ruma Arriaza*  MD Orders:  PT Eval and Treat   Date of Onset:  Onset Date: 09/15/15     Allergies:   Patient has no allergy information on record. Restrictions/Precautions:  Restrictions/Precautions: None  No data recorded   Interventions Planned (Treatment may consist of any combination of the following):    Current Treatment Recommendations: Strengthening; ROM; Manual Therapy - Soft Tissue Mobilization; Manual Therapy - Joint Manipulation; Pain management; Home exercise program; Aquatics     Subjective Comments:   Pt reports she is not really ready to be discharged. She likes coming to therapy. Initial:}    Post Session:       /10 no rated  Medications Last Reviewed:  2022  Updated Objective Findings:    Treatment   THERAPEUTIC EXERCISE: (40 minutes):    Exercises per grid below to improve mobility, strength and balance. Required MOD visual and verbal cues to promote proper body mechanics. Progressed repetitions and complexity of movement as indicated.       Stretching  - prone quad stretch with verbal cues to relax 1 min hold x2 B LE with therapist providing overpressure  - supine (modified Abraham stretch) 1 min hold x 2 B LE with therapist providing overpressure. Strengthening  Sit to stand at 23 1/2 inch table  just touching bottom and back up 3x10 with 8# dumbbell  Dead lift 8#  to stool 2x10   Prone hip extension 2x10  Bridges 3x10  Step ups 8 inch x15 each LE with no UE support  Single leg balance 15-30\"x3 ea in doorway for support  Gastroc stretch on incline 30\"x2  Walking forward and backward 30 ft x2 and side stepping 30 ftx2 and marching 30 ft x2  Standing gastroc stretch 30\" ea and standing hip flexor stretch with foot on chair 30\" ea  Added pelvic tilts, pelvic tilt with marching, and pelvic tilts with alternating hip and knee extension. Treatment/Session Summary:    Treatment Assessment:   She is ready to be discharged from therapy. Communication/Consultation:    Equipment provided today: none    Total Treatment Billable Duration:  40 minutes  Time In: 0935  Time Out: 1015    Breanna Kelly, PT           Charge Capture  }Post Session Pain  PT Visit Info  [employer] Portal  MD Guidelines  Scanned Media  Benefits  MyChart    No future appointments.

## 2023-05-28 ENCOUNTER — HOSPITAL ENCOUNTER (EMERGENCY)
Age: 63
Discharge: HOME | End: 2023-05-28
Attending: GENERAL PRACTICE
Payer: MEDICARE

## 2023-05-28 VITALS
WEIGHT: 242 LBS | SYSTOLIC BLOOD PRESSURE: 154 MMHG | BODY MASS INDEX: 38.89 KG/M2 | HEART RATE: 89 BPM | OXYGEN SATURATION: 97 % | HEIGHT: 66 IN | DIASTOLIC BLOOD PRESSURE: 96 MMHG | RESPIRATION RATE: 18 BRPM | TEMPERATURE: 98.9 F

## 2023-05-28 DIAGNOSIS — J02.9 PHARYNGITIS, UNSPECIFIED ETIOLOGY: Primary | ICD-10-CM

## 2023-05-28 PROCEDURE — 99283 EMERGENCY DEPT VISIT LOW MDM: CPT

## 2023-05-28 PROCEDURE — 6370000000 HC RX 637 (ALT 250 FOR IP): Performed by: GENERAL PRACTICE

## 2023-05-28 RX ORDER — DICLOFENAC 16.05 MG/ML
SOLUTION TOPICAL
COMMUNITY
Start: 2021-09-09

## 2023-05-28 RX ORDER — DULOXETIN HYDROCHLORIDE 30 MG/1
30 CAPSULE, DELAYED RELEASE ORAL DAILY
COMMUNITY
Start: 2023-03-23

## 2023-05-28 RX ORDER — MELOXICAM 15 MG/1
15 TABLET ORAL DAILY
COMMUNITY
Start: 2023-03-23

## 2023-05-28 RX ORDER — HYDROXYZINE HYDROCHLORIDE 25 MG/1
25 TABLET, FILM COATED ORAL EVERY 8 HOURS PRN
COMMUNITY
Start: 2022-09-08

## 2023-05-28 RX ORDER — HYDROCHLOROTHIAZIDE 25 MG/1
25 TABLET ORAL DAILY
COMMUNITY
Start: 2023-03-16

## 2023-05-28 RX ORDER — ASPIRIN 81 MG/1
81 TABLET ORAL DAILY
COMMUNITY

## 2023-05-28 RX ORDER — MONTELUKAST SODIUM 10 MG/1
1 TABLET ORAL NIGHTLY
COMMUNITY
Start: 2022-08-25

## 2023-05-28 RX ORDER — LIDOCAINE HYDROCHLORIDE 20 MG/ML
15 SOLUTION OROPHARYNGEAL
Status: COMPLETED | OUTPATIENT
Start: 2023-05-28 | End: 2023-05-28

## 2023-05-28 RX ORDER — MAGNESIUM HYDROXIDE/ALUMINUM HYDROXICE/SIMETHICONE 120; 1200; 1200 MG/30ML; MG/30ML; MG/30ML
30 SUSPENSION ORAL
Status: COMPLETED | OUTPATIENT
Start: 2023-05-28 | End: 2023-05-28

## 2023-05-28 RX ORDER — FAMOTIDINE 20 MG/1
20 TABLET, FILM COATED ORAL 2 TIMES DAILY
COMMUNITY
Start: 2023-03-16

## 2023-05-28 RX ORDER — TRAMADOL HYDROCHLORIDE 50 MG/1
TABLET ORAL
COMMUNITY
Start: 2023-04-04

## 2023-05-28 RX ORDER — ACETAMINOPHEN 500 MG
500 TABLET ORAL PRN
COMMUNITY

## 2023-05-28 RX ORDER — ALBUTEROL SULFATE 90 UG/1
2 AEROSOL, METERED RESPIRATORY (INHALATION) EVERY 6 HOURS PRN
COMMUNITY
Start: 2022-10-27

## 2023-05-28 RX ORDER — CETIRIZINE HYDROCHLORIDE 10 MG/1
TABLET ORAL DAILY
COMMUNITY

## 2023-05-28 RX ORDER — AZELASTINE 1 MG/ML
2 SPRAY, METERED NASAL 2 TIMES DAILY
COMMUNITY
Start: 2022-05-02

## 2023-05-28 RX ADMIN — ALUMINUM HYDROXIDE, MAGNESIUM HYDROXIDE, AND SIMETHICONE 30 ML: 200; 200; 20 SUSPENSION ORAL at 14:54

## 2023-05-28 RX ADMIN — Medication 15 ML: at 14:54

## 2023-05-28 ASSESSMENT — LIFESTYLE VARIABLES
HOW OFTEN DO YOU HAVE A DRINK CONTAINING ALCOHOL: NEVER
HOW MANY STANDARD DRINKS CONTAINING ALCOHOL DO YOU HAVE ON A TYPICAL DAY: PATIENT DOES NOT DRINK

## 2023-05-28 ASSESSMENT — PAIN DESCRIPTION - LOCATION
LOCATION: THROAT
LOCATION: THROAT

## 2023-05-28 ASSESSMENT — PAIN SCALES - GENERAL
PAINLEVEL_OUTOF10: 4
PAINLEVEL_OUTOF10: 5

## 2023-05-28 ASSESSMENT — PAIN - FUNCTIONAL ASSESSMENT: PAIN_FUNCTIONAL_ASSESSMENT: 0-10

## 2023-10-16 ENCOUNTER — HOSPITAL ENCOUNTER (OUTPATIENT)
Dept: MAMMOGRAPHY | Age: 63
Discharge: HOME | End: 2023-10-19
Payer: MEDICARE

## 2023-10-16 VITALS — HEIGHT: 66 IN | WEIGHT: 240 LBS | BODY MASS INDEX: 38.57 KG/M2

## 2023-10-16 DIAGNOSIS — Z12.39 BREAST SCREENING: ICD-10-CM

## 2023-10-16 PROCEDURE — 77063 BREAST TOMOSYNTHESIS BI: CPT

## 2025-01-10 ENCOUNTER — HOSPITAL ENCOUNTER (EMERGENCY)
Age: 65
Discharge: HOME | End: 2025-01-11
Attending: EMERGENCY MEDICINE
Payer: MEDICARE

## 2025-01-10 DIAGNOSIS — R00.2 PALPITATIONS: ICD-10-CM

## 2025-01-10 DIAGNOSIS — U07.1 COVID-19 VIRUS INFECTION: Primary | ICD-10-CM

## 2025-01-10 PROCEDURE — 99284 EMERGENCY DEPT VISIT MOD MDM: CPT

## 2025-01-10 RX ORDER — PREDNISONE 20 MG/1
40 TABLET ORAL DAILY
COMMUNITY
Start: 2025-01-08 | End: 2025-01-13

## 2025-01-10 RX ORDER — LEVOFLOXACIN 750 MG/1
750 TABLET, FILM COATED ORAL DAILY
COMMUNITY
Start: 2025-01-08 | End: 2025-01-13

## 2025-01-10 ASSESSMENT — PAIN - FUNCTIONAL ASSESSMENT: PAIN_FUNCTIONAL_ASSESSMENT: NONE - DENIES PAIN

## 2025-01-10 ASSESSMENT — PAIN SCALES - GENERAL: PAINLEVEL_OUTOF10: 0

## 2025-01-11 ENCOUNTER — APPOINTMENT (OUTPATIENT)
Dept: GENERAL RADIOLOGY | Age: 65
End: 2025-01-11
Payer: MEDICARE

## 2025-01-11 VITALS
WEIGHT: 231 LBS | SYSTOLIC BLOOD PRESSURE: 134 MMHG | OXYGEN SATURATION: 98 % | HEART RATE: 64 BPM | TEMPERATURE: 98.3 F | RESPIRATION RATE: 14 BRPM | BODY MASS INDEX: 37.12 KG/M2 | DIASTOLIC BLOOD PRESSURE: 74 MMHG | HEIGHT: 66 IN

## 2025-01-11 LAB
FLUAV RNA SPEC QL NAA+PROBE: NOT DETECTED
FLUBV RNA SPEC QL NAA+PROBE: NOT DETECTED
SARS-COV-2 RDRP RESP QL NAA+PROBE: DETECTED
SOURCE: ABNORMAL

## 2025-01-11 PROCEDURE — 6370000000 HC RX 637 (ALT 250 FOR IP): Performed by: EMERGENCY MEDICINE

## 2025-01-11 PROCEDURE — 87502 INFLUENZA DNA AMP PROBE: CPT

## 2025-01-11 PROCEDURE — 70360 X-RAY EXAM OF NECK: CPT

## 2025-01-11 PROCEDURE — 87635 SARS-COV-2 COVID-19 AMP PRB: CPT

## 2025-01-11 RX ORDER — OXYMETAZOLINE HYDROCHLORIDE 0.05 G/100ML
2 SPRAY NASAL
Status: COMPLETED | OUTPATIENT
Start: 2025-01-11 | End: 2025-01-11

## 2025-01-11 RX ADMIN — OXYMETAZOLINE HYDROCHLORIDE 2 SPRAY: 0.5 SPRAY NASAL at 01:54

## 2025-01-11 NOTE — ED TRIAGE NOTES
Patient verified by name and  prior to triage. Pt presents to the ER with EMS.  Pt accompianed by self.  Pt and/or family reports she was diagnosed 3 days ago with bronchitis and given medications.  Pt reports she feels like her throat is 'getting smaller' and her tongue is getting larger.  Pt was told by dispatch to use her EPI pen which she did and she also took 25 mg of benadryl prior to arrival.   Pt also reports palpitations x 2 days.

## 2025-01-11 NOTE — ED PROVIDER NOTES
Emergency Department Provider Note       PCP: Margie Andersen MD   Age: 64 y.o.   Sex: female     DISPOSITION Decision To Discharge 01/11/2025 02:21:49 AM    ICD-10-CM    1. COVID-19 virus infection  U07.1       2. Palpitations  R00.2           Medical Decision Making     64-year-old AA female presents to the emergency department complaining of sinus congestion and cough over the last 2-1/2 days, and tonight increased episodes of palpitations as well as a feeling that her throat is getting smaller and her tongue is getting larger and it is very difficult to get air out and in.  Patient states she was seen by her primary care, diagnosed with bronchitis, and placed on Levaquin and prednisone.  She states she was sent to the lab for COVID and flu testing, but they said they only did that in the doctor's office and they could not do it at the lab.  She has been able to reach her doctor since that happened.  Patient called EMS, and the dispatcher instructed her to use her EpiPen in her left leg due to the history of difficulty swallowing and breathing.  She also took 25 mg of Benadryl prior to arrival.  Patient states the symptoms is no longer progressed since her arrival.  On exam, the patient has significant sinus congestion, which I think is the etiology of her symptoms.  Uvula is midline with no evidence of swelling.  There is no evidence of adenopathy.  Lungs are clear to auscultation except for a slight wheeze.  Soft tissue neck reveals no evidence of acute abnormality, flu testing was negative but COVID-19 was positive.  Patient instructed that she probably could stop the Levaquin, take Delsym as needed for cough, and I offered to write for Paxlovid, the patient declined at this time.  Symptoms were pretty much 100% resolved with Afrin nasal spray.  Patient instructed to use it twice a day for the next 3 days only, as using it for more than 3 days will cause her dependence.   1 or more acute illnesses that pose  % NASAL SPRAY    2 sprays by Nasal route 2 times daily    CETIRIZINE (ZYRTEC) 10 MG TABLET    Take by mouth daily    DICLOFENAC SODIUM 1.5 % SOLN    1-2ML to affected area 3-4 times per day    DULOXETINE (CYMBALTA) 30 MG EXTENDED RELEASE CAPSULE    Take 1 capsule by mouth daily    FAMOTIDINE (PEPCID) 20 MG TABLET    Take 1 tablet by mouth 2 times daily    HYDROCHLOROTHIAZIDE (HYDRODIURIL) 25 MG TABLET    Take 1 tablet by mouth daily    HYDROXYZINE HCL (ATARAX) 25 MG TABLET    Take 1 tablet by mouth every 8 hours as needed    LEVOFLOXACIN (LEVAQUIN) 750 MG TABLET    Take 1 tablet by mouth daily    MELOXICAM (MOBIC) 15 MG TABLET    Take 1 tablet by mouth daily    MONTELUKAST (SINGULAIR) 10 MG TABLET    Take 1 tablet by mouth nightly    PREDNISONE (DELTASONE) 20 MG TABLET    Take 2 tablets by mouth daily    TRAMADOL (ULTRAM) 50 MG TABLET    Do not start before April 4, 2023. 1-2 po bid prn severe episode pain        Results from this emergency department visit:      Results Reviewed:      Recent Results (from the past 24 hour(s))   COVID-19, Rapid    Collection Time: 01/11/25  1:30 AM    Specimen: Nasopharyngeal   Result Value Ref Range    Source Nasopharyngeal      SARS-CoV-2, Rapid Detected (A) NOTD     Influenza A/B, Molecular    Collection Time: 01/11/25  1:48 AM    Specimen: Nasopharyngeal   Result Value Ref Range    Influenza A, SAUNDRA Not detected NOTD      Influenza B, SAUNDRA Not detected NOTD         I discussed the results of all labs, procedures, radiographs, and treatments with the patient and available family.  Treatment plan is agreed upon and the patient is ready for discharge.  All voiced understanding of the discharge plan and medication instructions or changes as appropriate.  Questions about treatment in the ED were answered.  All were encouraged to return should symptoms worsen or new problems develop.     Voice dictation software was used during the making of this note.  This software is not perfect and

## 2025-07-17 ENCOUNTER — HOSPITAL ENCOUNTER (OUTPATIENT)
Dept: MAMMOGRAPHY | Age: 65
Discharge: HOME | End: 2025-07-20
Payer: MEDICARE

## 2025-07-17 VITALS — WEIGHT: 231 LBS | BODY MASS INDEX: 37.12 KG/M2 | HEIGHT: 66 IN

## 2025-07-17 DIAGNOSIS — Z12.31 ENCOUNTER FOR SCREENING MAMMOGRAM FOR MALIGNANT NEOPLASM OF BREAST: ICD-10-CM

## 2025-07-17 PROCEDURE — 77063 BREAST TOMOSYNTHESIS BI: CPT
